# Patient Record
Sex: MALE | Race: WHITE | NOT HISPANIC OR LATINO
[De-identification: names, ages, dates, MRNs, and addresses within clinical notes are randomized per-mention and may not be internally consistent; named-entity substitution may affect disease eponyms.]

---

## 2018-05-01 ENCOUNTER — APPOINTMENT (OUTPATIENT)
Dept: ALLERGY | Facility: CLINIC | Age: 58
End: 2018-05-01
Payer: COMMERCIAL

## 2018-05-01 VITALS
OXYGEN SATURATION: 96 % | TEMPERATURE: 98.9 F | HEIGHT: 63 IN | HEART RATE: 71 BPM | WEIGHT: 240 LBS | DIASTOLIC BLOOD PRESSURE: 75 MMHG | SYSTOLIC BLOOD PRESSURE: 125 MMHG | BODY MASS INDEX: 42.52 KG/M2 | RESPIRATION RATE: 14 BRPM

## 2018-05-01 DIAGNOSIS — Z87.891 PERSONAL HISTORY OF NICOTINE DEPENDENCE: ICD-10-CM

## 2018-05-01 PROCEDURE — 95004 PERQ TESTS W/ALRGNC XTRCS: CPT

## 2018-05-01 PROCEDURE — 99204 OFFICE O/P NEW MOD 45 MIN: CPT | Mod: 25

## 2018-05-01 PROCEDURE — 95018 ALL TSTG PERQ&IQ DRUGS/BIOL: CPT

## 2018-06-06 ENCOUNTER — APPOINTMENT (OUTPATIENT)
Dept: ALLERGY | Facility: CLINIC | Age: 58
End: 2018-06-06
Payer: COMMERCIAL

## 2018-06-06 VITALS
SYSTOLIC BLOOD PRESSURE: 122 MMHG | HEIGHT: 63 IN | BODY MASS INDEX: 42.52 KG/M2 | DIASTOLIC BLOOD PRESSURE: 80 MMHG | HEART RATE: 72 BPM | WEIGHT: 240 LBS

## 2018-06-06 PROCEDURE — 99214 OFFICE O/P EST MOD 30 MIN: CPT

## 2018-06-06 RX ORDER — MULTIVIT-MIN/FOLIC/VIT K/LYCOP 400-300MCG
50 MCG TABLET ORAL
Refills: 0 | Status: ACTIVE | COMMUNITY

## 2018-06-27 ENCOUNTER — APPOINTMENT (OUTPATIENT)
Dept: ALLERGY | Facility: CLINIC | Age: 58
End: 2018-06-27
Payer: COMMERCIAL

## 2018-06-27 VITALS
RESPIRATION RATE: 14 BRPM | SYSTOLIC BLOOD PRESSURE: 113 MMHG | WEIGHT: 230 LBS | HEART RATE: 70 BPM | DIASTOLIC BLOOD PRESSURE: 67 MMHG | OXYGEN SATURATION: 97 % | BODY MASS INDEX: 40.75 KG/M2 | HEIGHT: 63 IN

## 2018-06-27 PROCEDURE — 99214 OFFICE O/P EST MOD 30 MIN: CPT | Mod: 25

## 2018-06-27 RX ORDER — PREDNISONE 10 MG/1
10 TABLET ORAL
Qty: 30 | Refills: 0 | Status: DISCONTINUED | COMMUNITY
Start: 2018-06-06 | End: 2018-06-27

## 2018-07-13 ENCOUNTER — APPOINTMENT (OUTPATIENT)
Dept: RADIOLOGY | Facility: CLINIC | Age: 58
End: 2018-07-13

## 2018-07-25 ENCOUNTER — APPOINTMENT (OUTPATIENT)
Dept: ALLERGY | Facility: CLINIC | Age: 58
End: 2018-07-25
Payer: COMMERCIAL

## 2018-07-25 VITALS
HEIGHT: 63 IN | OXYGEN SATURATION: 96 % | SYSTOLIC BLOOD PRESSURE: 109 MMHG | BODY MASS INDEX: 40.75 KG/M2 | RESPIRATION RATE: 14 BRPM | DIASTOLIC BLOOD PRESSURE: 66 MMHG | HEART RATE: 59 BPM | WEIGHT: 230 LBS

## 2018-07-25 PROCEDURE — 99214 OFFICE O/P EST MOD 30 MIN: CPT | Mod: 25

## 2018-09-05 ENCOUNTER — APPOINTMENT (OUTPATIENT)
Dept: ALLERGY | Facility: CLINIC | Age: 58
End: 2018-09-05
Payer: COMMERCIAL

## 2018-09-05 VITALS
BODY MASS INDEX: 40.75 KG/M2 | OXYGEN SATURATION: 96 % | WEIGHT: 230 LBS | SYSTOLIC BLOOD PRESSURE: 123 MMHG | RESPIRATION RATE: 14 BRPM | HEIGHT: 63 IN | DIASTOLIC BLOOD PRESSURE: 75 MMHG | HEART RATE: 63 BPM

## 2018-09-05 PROCEDURE — 99214 OFFICE O/P EST MOD 30 MIN: CPT | Mod: 25

## 2018-11-07 ENCOUNTER — RECORD ABSTRACTING (OUTPATIENT)
Age: 58
End: 2018-11-07

## 2018-11-07 DIAGNOSIS — L30.4 ERYTHEMA INTERTRIGO: ICD-10-CM

## 2018-11-07 DIAGNOSIS — Z87.19 PERSONAL HISTORY OF OTHER DISEASES OF THE DIGESTIVE SYSTEM: ICD-10-CM

## 2018-11-07 DIAGNOSIS — Z83.3 FAMILY HISTORY OF DIABETES MELLITUS: ICD-10-CM

## 2018-11-30 ENCOUNTER — APPOINTMENT (OUTPATIENT)
Dept: INTERNAL MEDICINE | Facility: CLINIC | Age: 58
End: 2018-11-30
Payer: MEDICAID

## 2018-11-30 ENCOUNTER — NON-APPOINTMENT (OUTPATIENT)
Age: 58
End: 2018-11-30

## 2018-11-30 VITALS
WEIGHT: 245 LBS | DIASTOLIC BLOOD PRESSURE: 66 MMHG | HEART RATE: 64 BPM | SYSTOLIC BLOOD PRESSURE: 104 MMHG | BODY MASS INDEX: 43.4 KG/M2

## 2018-11-30 PROCEDURE — 99214 OFFICE O/P EST MOD 30 MIN: CPT | Mod: 25

## 2018-11-30 PROCEDURE — 93000 ELECTROCARDIOGRAM COMPLETE: CPT

## 2018-11-30 PROCEDURE — 36415 COLL VENOUS BLD VENIPUNCTURE: CPT

## 2018-11-30 RX ORDER — LEVOTHYROXINE SODIUM 0.17 MG/1
TABLET ORAL
Refills: 0 | Status: DISCONTINUED | COMMUNITY
End: 2018-11-30

## 2018-11-30 RX ORDER — PAROXETINE HYDROCHLORIDE 40 MG/1
TABLET, FILM COATED ORAL
Refills: 0 | Status: DISCONTINUED | COMMUNITY
End: 2018-11-30

## 2018-11-30 RX ORDER — ALPRAZOLAM 2 MG/1
TABLET ORAL
Refills: 0 | Status: DISCONTINUED | COMMUNITY
End: 2018-11-30

## 2018-11-30 RX ORDER — HYDROXYZINE HYDROCHLORIDE 10 MG/1
10 TABLET ORAL
Qty: 120 | Refills: 3 | Status: DISCONTINUED | COMMUNITY
Start: 2018-06-27 | End: 2018-11-30

## 2018-11-30 RX ORDER — ROSUVASTATIN CALCIUM 10 MG/1
10 TABLET, FILM COATED ORAL
Refills: 0 | Status: DISCONTINUED | COMMUNITY
End: 2018-11-30

## 2018-11-30 NOTE — ASSESSMENT
[FreeTextEntry1] : Despite his only having partial relief of the itching he should continue taking the hydroxyzine at night.\par \par He is going to call me in a week for the results of today's blood tests which are focusing on his response to statin therapy and thyroid replacement hormone therapy.\par \par His anxiety will remain under treatment with episodic use of benzodiazepine.\par \par He is aware that his weight is an issue and that he would be important for him to continue with efforts to lose weight.\par \par RV 3 m

## 2018-11-30 NOTE — RESULTS/DATA
[ECG Reviewed] : reviewed [Normal] : The 12 - lead ECG is normal [P Waves Normal] : the P wave is normal [Normal QRS] : the QRS is normal [Normal ST Segments] : the ST segments are normal [No Interval Change] : no interval change

## 2018-11-30 NOTE — HISTORY OF PRESENT ILLNESS
[Patient was last seen on ___] : Patient was last seen on [unfilled] [Stable] : Patient is stable [Moderate Intensity Therapy] : Patient is currently on moderate intensity statin  therapy [FreeTextEntry6] : Existing patient\par Scheduled appointment\par Chronic problem\par Management of issues\par Coordination of therapies\par \par \par \par \par \par \par \par

## 2018-12-01 LAB
ALBUMIN SERPL ELPH-MCNC: 4.3 G/DL
ALP BLD-CCNC: 70 U/L
ALT SERPL-CCNC: 37 U/L
ANION GAP SERPL CALC-SCNC: 12 MMOL/L
AST SERPL-CCNC: 27 U/L
BASOPHILS # BLD AUTO: 0.02 K/UL
BASOPHILS NFR BLD AUTO: 0.3 %
BILIRUB SERPL-MCNC: 0.4 MG/DL
BUN SERPL-MCNC: 13 MG/DL
CALCIUM SERPL-MCNC: 9.3 MG/DL
CHLORIDE SERPL-SCNC: 101 MMOL/L
CHOLEST SERPL-MCNC: 134 MG/DL
CHOLEST/HDLC SERPL: 2.9 RATIO
CK SERPL-CCNC: 220 U/L
CO2 SERPL-SCNC: 28 MMOL/L
CREAT SERPL-MCNC: 0.97 MG/DL
EOSINOPHIL # BLD AUTO: 0.23 K/UL
EOSINOPHIL NFR BLD AUTO: 3.5 %
GLUCOSE SERPL-MCNC: 78 MG/DL
HCT VFR BLD CALC: 44.5 %
HDLC SERPL-MCNC: 47 MG/DL
HGB BLD-MCNC: 14.5 G/DL
IMM GRANULOCYTES NFR BLD AUTO: 0.2 %
LDLC SERPL CALC-MCNC: 58 MG/DL
LYMPHOCYTES # BLD AUTO: 2.05 K/UL
LYMPHOCYTES NFR BLD AUTO: 30.8 %
MAN DIFF?: NORMAL
MCHC RBC-ENTMCNC: 29.8 PG
MCHC RBC-ENTMCNC: 32.6 GM/DL
MCV RBC AUTO: 91.6 FL
MONOCYTES # BLD AUTO: 0.9 K/UL
MONOCYTES NFR BLD AUTO: 13.5 %
NEUTROPHILS # BLD AUTO: 3.45 K/UL
NEUTROPHILS NFR BLD AUTO: 51.7 %
PLATELET # BLD AUTO: 283 K/UL
POTASSIUM SERPL-SCNC: 4.2 MMOL/L
PROT SERPL-MCNC: 7 G/DL
RBC # BLD: 4.86 M/UL
RBC # FLD: 14 %
SODIUM SERPL-SCNC: 141 MMOL/L
T4 SERPL-MCNC: 6.7 UG/DL
TRIGL SERPL-MCNC: 143 MG/DL
TSH SERPL-ACNC: 1.69 UIU/ML
WBC # FLD AUTO: 6.66 K/UL

## 2019-02-28 ENCOUNTER — APPOINTMENT (OUTPATIENT)
Dept: INTERNAL MEDICINE | Facility: CLINIC | Age: 59
End: 2019-02-28
Payer: MEDICAID

## 2019-02-28 VITALS — SYSTOLIC BLOOD PRESSURE: 130 MMHG | HEART RATE: 64 BPM | DIASTOLIC BLOOD PRESSURE: 79 MMHG

## 2019-02-28 DIAGNOSIS — L29.9 PRURITUS, UNSPECIFIED: ICD-10-CM

## 2019-02-28 PROCEDURE — 99213 OFFICE O/P EST LOW 20 MIN: CPT

## 2019-02-28 NOTE — HISTORY OF PRESENT ILLNESS
[Depression] : Depression [Hyperlipidemia] : Hyperlipidemia [Obesity] : Obesity [Stable] : Patient is stable [Moderate Intensity Therapy] : Patient is currently on moderate intensity statin  therapy [No Weight Changes] : No weight changes [Sedentary] : Patient is sedentary [Good understanding] : Patient has a good understanding of disease, goals and obesity follow-up plan [Improving] : Overall status is improving [FreeTextEntry6] : Last seen three months ago\par  [de-identified] : Not current ly depressed\par Anxiety still there   chronic   takes benzo hs and hydrox   for the itch and to help get restful sleep

## 2019-02-28 NOTE — ASSESSMENT
[FreeTextEntry1] : Doing well overall\par   Chronic issues of anxiety and itch have been refractory to dx and Rx\par

## 2019-03-26 ENCOUNTER — MEDICATION RENEWAL (OUTPATIENT)
Age: 59
End: 2019-03-26

## 2019-04-11 ENCOUNTER — MEDICATION RENEWAL (OUTPATIENT)
Age: 59
End: 2019-04-11

## 2019-04-26 ENCOUNTER — CHART COPY (OUTPATIENT)
Age: 59
End: 2019-04-26

## 2019-04-29 ENCOUNTER — MEDICATION RENEWAL (OUTPATIENT)
Age: 59
End: 2019-04-29

## 2019-05-09 ENCOUNTER — APPOINTMENT (OUTPATIENT)
Dept: INTERNAL MEDICINE | Facility: CLINIC | Age: 59
End: 2019-05-09
Payer: MEDICAID

## 2019-05-09 PROCEDURE — 36415 COLL VENOUS BLD VENIPUNCTURE: CPT

## 2019-05-11 LAB
MEV IGG FLD QL IA: 71.5 AU/ML
MEV IGG+IGM SER-IMP: POSITIVE
MUV AB SER-ACNC: POSITIVE
MUV IGG SER QL IA: >300 AU/ML
RUBV IGG FLD-ACNC: 8.4 INDEX
RUBV IGG SER-IMP: POSITIVE

## 2019-06-17 ENCOUNTER — RX RENEWAL (OUTPATIENT)
Age: 59
End: 2019-06-17

## 2019-06-17 ENCOUNTER — APPOINTMENT (OUTPATIENT)
Dept: INTERNAL MEDICINE | Facility: CLINIC | Age: 59
End: 2019-06-17
Payer: MEDICAID

## 2019-06-17 VITALS — DIASTOLIC BLOOD PRESSURE: 70 MMHG | HEART RATE: 64 BPM | SYSTOLIC BLOOD PRESSURE: 124 MMHG

## 2019-06-17 PROCEDURE — 99213 OFFICE O/P EST LOW 20 MIN: CPT | Mod: 25

## 2019-06-17 PROCEDURE — 36415 COLL VENOUS BLD VENIPUNCTURE: CPT

## 2019-06-17 NOTE — HISTORY OF PRESENT ILLNESS
[Depression] : Depression [Hyperlipidemia] : Hyperlipidemia [Obesity] : Obesity [FreeTextEntry6] : Last seen three months ago\par  [Stable] : Patient is stable [Moderate Intensity Therapy] : Patient is currently on moderate intensity statin  therapy [No Weight Changes] : No weight changes [Sedentary] : Patient is sedentary [Good understanding] : Patient has a good understanding of disease, goals and obesity follow-up plan [Improving] : Overall status is improving [de-identified] : Not current ly depressed\par Anxiety still there   chronic   takes benzo hs and hydrox   for the itch and to help get restful sleep

## 2019-06-18 LAB
ALBUMIN SERPL ELPH-MCNC: 4.1 G/DL
ALP BLD-CCNC: 64 U/L
ALT SERPL-CCNC: 27 U/L
ANION GAP SERPL CALC-SCNC: 14 MMOL/L
AST SERPL-CCNC: 20 U/L
BASOPHILS # BLD AUTO: 0.04 K/UL
BASOPHILS NFR BLD AUTO: 0.6 %
BILIRUB SERPL-MCNC: 0.4 MG/DL
BUN SERPL-MCNC: 13 MG/DL
CALCIUM SERPL-MCNC: 9.5 MG/DL
CHLORIDE SERPL-SCNC: 99 MMOL/L
CHOLEST SERPL-MCNC: 138 MG/DL
CHOLEST/HDLC SERPL: 2.8 RATIO
CK SERPL-CCNC: 148 U/L
CO2 SERPL-SCNC: 27 MMOL/L
CREAT SERPL-MCNC: 1.01 MG/DL
EOSINOPHIL # BLD AUTO: 0.2 K/UL
EOSINOPHIL NFR BLD AUTO: 3.2 %
ESTIMATED AVERAGE GLUCOSE: 114 MG/DL
GGT SERPL-CCNC: 18 U/L
GLUCOSE SERPL-MCNC: 74 MG/DL
HBA1C MFR BLD HPLC: 5.6 %
HCT VFR BLD CALC: 47 %
HDLC SERPL-MCNC: 50 MG/DL
HGB BLD-MCNC: 14.8 G/DL
IMM GRANULOCYTES NFR BLD AUTO: 0.3 %
LDLC SERPL CALC-MCNC: 70 MG/DL
LYMPHOCYTES # BLD AUTO: 1.56 K/UL
LYMPHOCYTES NFR BLD AUTO: 25.1 %
MAN DIFF?: NORMAL
MCHC RBC-ENTMCNC: 29.8 PG
MCHC RBC-ENTMCNC: 31.5 GM/DL
MCV RBC AUTO: 94.6 FL
MONOCYTES # BLD AUTO: 0.66 K/UL
MONOCYTES NFR BLD AUTO: 10.6 %
NEUTROPHILS # BLD AUTO: 3.73 K/UL
NEUTROPHILS NFR BLD AUTO: 60.2 %
PLATELET # BLD AUTO: 315 K/UL
POTASSIUM SERPL-SCNC: 4.1 MMOL/L
PROT SERPL-MCNC: 7 G/DL
RBC # BLD: 4.97 M/UL
RBC # FLD: 14 %
SODIUM SERPL-SCNC: 140 MMOL/L
T4 FREE SERPL-MCNC: 1.2 NG/DL
TRIGL SERPL-MCNC: 92 MG/DL
TSH SERPL-ACNC: 1.33 UIU/ML
WBC # FLD AUTO: 6.21 K/UL

## 2019-10-13 ENCOUNTER — RX RENEWAL (OUTPATIENT)
Age: 59
End: 2019-10-13

## 2019-10-15 ENCOUNTER — APPOINTMENT (OUTPATIENT)
Dept: INTERNAL MEDICINE | Facility: CLINIC | Age: 59
End: 2019-10-15
Payer: MEDICAID

## 2019-10-15 VITALS
SYSTOLIC BLOOD PRESSURE: 125 MMHG | DIASTOLIC BLOOD PRESSURE: 79 MMHG | WEIGHT: 241 LBS | HEART RATE: 67 BPM | HEIGHT: 63 IN | BODY MASS INDEX: 42.7 KG/M2

## 2019-10-15 LAB
ALBUMIN SERPL ELPH-MCNC: 4.4 G/DL
ALP BLD-CCNC: 70 U/L
ALT SERPL-CCNC: 22 U/L
ANION GAP SERPL CALC-SCNC: 14 MMOL/L
AST SERPL-CCNC: 22 U/L
BASOPHILS # BLD AUTO: 0.04 K/UL
BASOPHILS NFR BLD AUTO: 0.6 %
BILIRUB SERPL-MCNC: 0.7 MG/DL
BUN SERPL-MCNC: 13 MG/DL
CALCIUM SERPL-MCNC: 9.5 MG/DL
CHLORIDE SERPL-SCNC: 101 MMOL/L
CHOLEST SERPL-MCNC: 129 MG/DL
CHOLEST/HDLC SERPL: 2.5 RATIO
CK SERPL-CCNC: 282 U/L
CO2 SERPL-SCNC: 25 MMOL/L
CREAT SERPL-MCNC: 0.97 MG/DL
EOSINOPHIL # BLD AUTO: 0.13 K/UL
EOSINOPHIL NFR BLD AUTO: 1.9 %
GGT SERPL-CCNC: 17 U/L
GLUCOSE SERPL-MCNC: 107 MG/DL
HCT VFR BLD CALC: 44.9 %
HDLC SERPL-MCNC: 51 MG/DL
HGB BLD-MCNC: 14.7 G/DL
IMM GRANULOCYTES NFR BLD AUTO: 0.3 %
LDLC SERPL CALC-MCNC: 62 MG/DL
LYMPHOCYTES # BLD AUTO: 1.75 K/UL
LYMPHOCYTES NFR BLD AUTO: 26 %
MAN DIFF?: NORMAL
MCHC RBC-ENTMCNC: 29.9 PG
MCHC RBC-ENTMCNC: 32.7 GM/DL
MCV RBC AUTO: 91.3 FL
MONOCYTES # BLD AUTO: 0.78 K/UL
MONOCYTES NFR BLD AUTO: 11.6 %
NEUTROPHILS # BLD AUTO: 4 K/UL
NEUTROPHILS NFR BLD AUTO: 59.6 %
PLATELET # BLD AUTO: 339 K/UL
POTASSIUM SERPL-SCNC: 4.3 MMOL/L
PROT SERPL-MCNC: 6.9 G/DL
RBC # BLD: 4.92 M/UL
RBC # FLD: 13.1 %
SODIUM SERPL-SCNC: 140 MMOL/L
T4 FREE SERPL-MCNC: 1.5 NG/DL
TRIGL SERPL-MCNC: 79 MG/DL
TSH SERPL-ACNC: 1.84 UIU/ML
WBC # FLD AUTO: 6.72 K/UL

## 2019-10-15 PROCEDURE — 90686 IIV4 VACC NO PRSV 0.5 ML IM: CPT

## 2019-10-15 PROCEDURE — 36415 COLL VENOUS BLD VENIPUNCTURE: CPT

## 2019-10-15 PROCEDURE — G0008: CPT

## 2019-10-15 PROCEDURE — 99214 OFFICE O/P EST MOD 30 MIN: CPT | Mod: 25

## 2019-10-15 NOTE — PHYSICAL EXAM
[No Acute Distress] : no acute distress [Well Nourished] : well nourished [Well Developed] : well developed [Well-Appearing] : well-appearing [PERRL] : pupils equal round and reactive to light [Normal Sclera/Conjunctiva] : normal sclera/conjunctiva [EOMI] : extraocular movements intact [Normal Outer Ear/Nose] : the outer ears and nose were normal in appearance [Normal Oropharynx] : the oropharynx was normal [No JVD] : no jugular venous distention [No Lymphadenopathy] : no lymphadenopathy [Supple] : supple [Thyroid Normal, No Nodules] : the thyroid was normal and there were no nodules present [Clear to Auscultation] : lungs were clear to auscultation bilaterally [No Respiratory Distress] : no respiratory distress  [Normal Rate] : normal rate  [No Accessory Muscle Use] : no accessory muscle use [Regular Rhythm] : with a regular rhythm [No Murmur] : no murmur heard [Normal S1, S2] : normal S1 and S2 [No Carotid Bruits] : no carotid bruits [No Abdominal Bruit] : a ~M bruit was not heard ~T in the abdomen [No Varicosities] : no varicosities [Pedal Pulses Present] : the pedal pulses are present [No Edema] : there was no peripheral edema [No Extremity Clubbing/Cyanosis] : no extremity clubbing/cyanosis [Soft] : abdomen soft [No Palpable Aorta] : no palpable aorta [Non Tender] : non-tender [Non-distended] : non-distended [No Masses] : no abdominal mass palpated [No HSM] : no HSM [Normal Bowel Sounds] : normal bowel sounds [Normal Posterior Cervical Nodes] : no posterior cervical lymphadenopathy [Normal Anterior Cervical Nodes] : no anterior cervical lymphadenopathy [No CVA Tenderness] : no CVA  tenderness [No Spinal Tenderness] : no spinal tenderness [No Joint Swelling] : no joint swelling [Grossly Normal Strength/Tone] : grossly normal strength/tone [No Rash] : no rash [Coordination Grossly Intact] : coordination grossly intact [Normal Gait] : normal gait [No Focal Deficits] : no focal deficits [Deep Tendon Reflexes (DTR)] : deep tendon reflexes were 2+ and symmetric [Normal Affect] : the affect was normal [Normal Insight/Judgement] : insight and judgment were intact

## 2019-10-15 NOTE — HISTORY OF PRESENT ILLNESS
[Depression] : Depression [Hyperlipidemia] : Hyperlipidemia [Obesity] : Obesity [Stable] : Patient is stable [Moderate Intensity Therapy] : Patient is currently on moderate intensity statin  therapy [No Weight Changes] : No weight changes [Sedentary] : Patient is sedentary [Improving] : Overall status is improving [FreeTextEntry6] : Last seen three months ago\par  [de-identified] : Not current ly depressed\par Anxiety still there   chronic   takes benzo hs and hydrox   for the itch and to help get restful sleep

## 2019-12-27 ENCOUNTER — APPOINTMENT (OUTPATIENT)
Dept: INTERNAL MEDICINE | Facility: AMBULATORY MEDICAL SERVICES | Age: 59
End: 2019-12-27
Payer: MEDICAID

## 2019-12-27 PROCEDURE — G0105: CPT | Mod: PT

## 2019-12-27 PROCEDURE — 45378 DIAGNOSTIC COLONOSCOPY: CPT | Mod: PT

## 2020-02-10 ENCOUNTER — APPOINTMENT (OUTPATIENT)
Dept: INTERNAL MEDICINE | Facility: CLINIC | Age: 60
End: 2020-02-10
Payer: COMMERCIAL

## 2020-02-10 VITALS
DIASTOLIC BLOOD PRESSURE: 76 MMHG | BODY MASS INDEX: 43.77 KG/M2 | HEART RATE: 68 BPM | OXYGEN SATURATION: 97 % | HEIGHT: 63 IN | RESPIRATION RATE: 16 BRPM | SYSTOLIC BLOOD PRESSURE: 119 MMHG | WEIGHT: 247 LBS | TEMPERATURE: 98.4 F

## 2020-02-10 DIAGNOSIS — Z12.12 ENCOUNTER FOR SCREENING FOR MALIGNANT NEOPLASM OF COLON: ICD-10-CM

## 2020-02-10 DIAGNOSIS — Z12.11 ENCOUNTER FOR SCREENING FOR MALIGNANT NEOPLASM OF COLON: ICD-10-CM

## 2020-02-10 LAB
ALBUMIN SERPL ELPH-MCNC: 4.3 G/DL
ALP BLD-CCNC: 77 U/L
ALT SERPL-CCNC: 25 U/L
ANION GAP SERPL CALC-SCNC: 10 MMOL/L
AST SERPL-CCNC: 23 U/L
BASOPHILS # BLD AUTO: 0.05 K/UL
BASOPHILS NFR BLD AUTO: 0.8 %
BILIRUB SERPL-MCNC: 0.4 MG/DL
BUN SERPL-MCNC: 11 MG/DL
CALCIUM SERPL-MCNC: 9.6 MG/DL
CHLORIDE SERPL-SCNC: 100 MMOL/L
CHOLEST SERPL-MCNC: 134 MG/DL
CHOLEST/HDLC SERPL: 2.7 RATIO
CK SERPL-CCNC: 160 U/L
CO2 SERPL-SCNC: 29 MMOL/L
CREAT SERPL-MCNC: 1.12 MG/DL
EOSINOPHIL # BLD AUTO: 0.14 K/UL
EOSINOPHIL NFR BLD AUTO: 2.2 %
GGT SERPL-CCNC: 20 U/L
GLUCOSE SERPL-MCNC: 109 MG/DL
HCT VFR BLD CALC: 44.7 %
HDLC SERPL-MCNC: 49 MG/DL
HGB BLD-MCNC: 14.8 G/DL
IMM GRANULOCYTES NFR BLD AUTO: 0.6 %
LDLC SERPL CALC-MCNC: 60 MG/DL
LYMPHOCYTES # BLD AUTO: 1.92 K/UL
LYMPHOCYTES NFR BLD AUTO: 30.7 %
MAN DIFF?: NORMAL
MCHC RBC-ENTMCNC: 29.1 PG
MCHC RBC-ENTMCNC: 33.1 GM/DL
MCV RBC AUTO: 88 FL
MONOCYTES # BLD AUTO: 0.66 K/UL
MONOCYTES NFR BLD AUTO: 10.5 %
NEUTROPHILS # BLD AUTO: 3.45 K/UL
NEUTROPHILS NFR BLD AUTO: 55.2 %
PLATELET # BLD AUTO: 329 K/UL
POTASSIUM SERPL-SCNC: 4.6 MMOL/L
PROT SERPL-MCNC: 6.9 G/DL
RBC # BLD: 5.08 M/UL
RBC # FLD: 13.4 %
SODIUM SERPL-SCNC: 140 MMOL/L
T4 FREE SERPL-MCNC: 1.2 NG/DL
TRIGL SERPL-MCNC: 123 MG/DL
TSH SERPL-ACNC: 2.04 UIU/ML
WBC # FLD AUTO: 6.26 K/UL

## 2020-02-10 PROCEDURE — 99214 OFFICE O/P EST MOD 30 MIN: CPT

## 2020-02-10 NOTE — PHYSICAL EXAM
[No Acute Distress] : no acute distress [Well Nourished] : well nourished [Well Developed] : well developed [Well-Appearing] : well-appearing [Normal Sclera/Conjunctiva] : normal sclera/conjunctiva [PERRL] : pupils equal round and reactive to light [EOMI] : extraocular movements intact [Normal Outer Ear/Nose] : the outer ears and nose were normal in appearance [Normal Oropharynx] : the oropharynx was normal [No JVD] : no jugular venous distention [No Lymphadenopathy] : no lymphadenopathy [Supple] : supple [Thyroid Normal, No Nodules] : the thyroid was normal and there were no nodules present [No Respiratory Distress] : no respiratory distress  [No Accessory Muscle Use] : no accessory muscle use [Clear to Auscultation] : lungs were clear to auscultation bilaterally [Normal Rate] : normal rate  [Regular Rhythm] : with a regular rhythm [Normal S1, S2] : normal S1 and S2 [No Murmur] : no murmur heard [No Carotid Bruits] : no carotid bruits [No Abdominal Bruit] : a ~M bruit was not heard ~T in the abdomen [No Varicosities] : no varicosities [Pedal Pulses Present] : the pedal pulses are present [No Palpable Aorta] : no palpable aorta [No Edema] : there was no peripheral edema [No Extremity Clubbing/Cyanosis] : no extremity clubbing/cyanosis [Soft] : abdomen soft [Non Tender] : non-tender [Non-distended] : non-distended [No Masses] : no abdominal mass palpated [No HSM] : no HSM [Normal Bowel Sounds] : normal bowel sounds [Normal Posterior Cervical Nodes] : no posterior cervical lymphadenopathy [Normal Anterior Cervical Nodes] : no anterior cervical lymphadenopathy [No CVA Tenderness] : no CVA  tenderness [No Spinal Tenderness] : no spinal tenderness [No Joint Swelling] : no joint swelling [Grossly Normal Strength/Tone] : grossly normal strength/tone [No Rash] : no rash [Coordination Grossly Intact] : coordination grossly intact [No Focal Deficits] : no focal deficits [Normal Gait] : normal gait [Deep Tendon Reflexes (DTR)] : deep tendon reflexes were 2+ and symmetric [Normal Affect] : the affect was normal [Normal Insight/Judgement] : insight and judgment were intact

## 2020-02-10 NOTE — HISTORY OF PRESENT ILLNESS
[FreeTextEntry1] : F/U\par \par Continuation of care\par \par Adherent to meds\par No apparent interval changes

## 2020-02-11 ENCOUNTER — RX RENEWAL (OUTPATIENT)
Age: 60
End: 2020-02-11

## 2020-02-12 LAB
THYROGLOB AB SERPL-ACNC: <20 IU/ML
THYROPEROXIDASE AB SERPL IA-ACNC: <10 IU/ML

## 2020-02-14 LAB — TOTAL T3-ESOTERIX: 130 NG/DL

## 2020-04-09 PROBLEM — Z12.11 ENCOUNTER FOR COLORECTAL CANCER SCREENING: Status: RESOLVED | Noted: 2019-12-18 | Resolved: 2020-04-09

## 2020-04-19 ENCOUNTER — RX RENEWAL (OUTPATIENT)
Age: 60
End: 2020-04-19

## 2020-06-08 ENCOUNTER — APPOINTMENT (OUTPATIENT)
Dept: INTERNAL MEDICINE | Facility: CLINIC | Age: 60
End: 2020-06-08

## 2020-06-09 ENCOUNTER — RESULT CHARGE (OUTPATIENT)
Age: 60
End: 2020-06-09

## 2020-06-09 ENCOUNTER — RX RENEWAL (OUTPATIENT)
Age: 60
End: 2020-06-09

## 2020-06-10 ENCOUNTER — LABORATORY RESULT (OUTPATIENT)
Age: 60
End: 2020-06-10

## 2020-06-10 ENCOUNTER — APPOINTMENT (OUTPATIENT)
Dept: INTERNAL MEDICINE | Facility: CLINIC | Age: 60
End: 2020-06-10
Payer: COMMERCIAL

## 2020-06-10 ENCOUNTER — NON-APPOINTMENT (OUTPATIENT)
Age: 60
End: 2020-06-10

## 2020-06-10 ENCOUNTER — MED ADMIN CHARGE (OUTPATIENT)
Age: 60
End: 2020-06-10

## 2020-06-10 VITALS
BODY MASS INDEX: 39.69 KG/M2 | OXYGEN SATURATION: 98 % | HEIGHT: 63 IN | SYSTOLIC BLOOD PRESSURE: 98 MMHG | TEMPERATURE: 98.4 F | DIASTOLIC BLOOD PRESSURE: 60 MMHG | RESPIRATION RATE: 14 BRPM | HEART RATE: 65 BPM | WEIGHT: 224 LBS

## 2020-06-10 DIAGNOSIS — Z63.5 DISRUPTION OF FAMILY BY SEPARATION AND DIVORCE: ICD-10-CM

## 2020-06-10 DIAGNOSIS — Z13.6 ENCOUNTER FOR SCREENING FOR CARDIOVASCULAR DISORDERS: ICD-10-CM

## 2020-06-10 DIAGNOSIS — Z83.3 FAMILY HISTORY OF DIABETES MELLITUS: ICD-10-CM

## 2020-06-10 DIAGNOSIS — Z12.5 ENCOUNTER FOR SCREENING FOR MALIGNANT NEOPLASM OF PROSTATE: ICD-10-CM

## 2020-06-10 LAB
BILIRUB UR QL STRIP: NEGATIVE
CLARITY UR: CLEAR
COLLECTION METHOD: NORMAL
GLUCOSE UR-MCNC: NEGATIVE
HCG UR QL: 0.2 EU/DL
HGB UR QL STRIP.AUTO: NEGATIVE
KETONES UR-MCNC: NEGATIVE
LEUKOCYTE ESTERASE UR QL STRIP: NEGATIVE
NITRITE UR QL STRIP: NEGATIVE
PH UR STRIP: 5.5
PROT UR STRIP-MCNC: NEGATIVE
SP GR UR STRIP: 1.02

## 2020-06-10 PROCEDURE — 90715 TDAP VACCINE 7 YRS/> IM: CPT

## 2020-06-10 PROCEDURE — 90471 IMMUNIZATION ADMIN: CPT

## 2020-06-10 PROCEDURE — G0442 ANNUAL ALCOHOL SCREEN 15 MIN: CPT

## 2020-06-10 PROCEDURE — 99386 PREV VISIT NEW AGE 40-64: CPT | Mod: 25

## 2020-06-10 PROCEDURE — 93000 ELECTROCARDIOGRAM COMPLETE: CPT

## 2020-06-10 PROCEDURE — 82270 OCCULT BLOOD FECES: CPT

## 2020-06-10 PROCEDURE — 36415 COLL VENOUS BLD VENIPUNCTURE: CPT

## 2020-06-10 PROCEDURE — 81003 URINALYSIS AUTO W/O SCOPE: CPT | Mod: QW

## 2020-06-10 SDOH — SOCIAL STABILITY - SOCIAL INSECURITY: DISRUPTION OF FAMILY BY SEPARATION AND DIVORCE: Z63.5

## 2020-06-10 NOTE — PHYSICAL EXAM
[No Acute Distress] : no acute distress [Well Nourished] : well nourished [Well Developed] : well developed [Normal Sclera/Conjunctiva] : normal sclera/conjunctiva [Well-Appearing] : well-appearing [PERRL] : pupils equal round and reactive to light [EOMI] : extraocular movements intact [Normal Outer Ear/Nose] : the outer ears and nose were normal in appearance [Normal Oropharynx] : the oropharynx was normal [No JVD] : no jugular venous distention [No Lymphadenopathy] : no lymphadenopathy [Supple] : supple [Thyroid Normal, No Nodules] : the thyroid was normal and there were no nodules present [No Respiratory Distress] : no respiratory distress  [No Accessory Muscle Use] : no accessory muscle use [Clear to Auscultation] : lungs were clear to auscultation bilaterally [Normal Rate] : normal rate  [Regular Rhythm] : with a regular rhythm [Normal S1, S2] : normal S1 and S2 [No Murmur] : no murmur heard [No Carotid Bruits] : no carotid bruits [No Abdominal Bruit] : a ~M bruit was not heard ~T in the abdomen [No Varicosities] : no varicosities [Pedal Pulses Present] : the pedal pulses are present [No Edema] : there was no peripheral edema [No Palpable Aorta] : no palpable aorta [No Extremity Clubbing/Cyanosis] : no extremity clubbing/cyanosis [Soft] : abdomen soft [Non Tender] : non-tender [No Masses] : no abdominal mass palpated [Non-distended] : non-distended [No HSM] : no HSM [Normal Bowel Sounds] : normal bowel sounds [Normal Posterior Cervical Nodes] : no posterior cervical lymphadenopathy [Normal Anterior Cervical Nodes] : no anterior cervical lymphadenopathy [No Spinal Tenderness] : no spinal tenderness [No CVA Tenderness] : no CVA  tenderness [No Joint Swelling] : no joint swelling [Grossly Normal Strength/Tone] : grossly normal strength/tone [No Rash] : no rash [Coordination Grossly Intact] : coordination grossly intact [No Focal Deficits] : no focal deficits [Deep Tendon Reflexes (DTR)] : deep tendon reflexes were 2+ and symmetric [Normal Gait] : normal gait [Normal Affect] : the affect was normal [Normal Insight/Judgement] : insight and judgment were intact [Obese] : obese [Normal Sphincter Tone] : normal sphincter tone [Urethral Meatus] : meatus normal [Penis Abnormality] : normal circumcised penis [Urinary Bladder Findings] : the bladder was normal on palpation [Scrotum] : the scrotum was normal [Rectal Exam - Seminal Vesicles] : the seminal vesicles were normal [Epididymis] : the epididymides were normal [Speech Grossly Normal] : speech grossly normal [Testes Tenderness] : no tenderness of the testes [Alert and Oriented x3] : oriented to person, place, and time [Normal Mood] : the mood was normal [Stool Occult Blood] : stool negative for occult blood [FreeTextEntry1] : small hemorrhoid   [de-identified] : small abrasion left scalp area

## 2020-06-10 NOTE — PLAN
[FreeTextEntry1] : cardio   -hyperlipidemia, obesity  - screening for CAD -  We reviewed and discussed the EKG .  Patient was advised the importance of participating in aerobic exercise programs improve their stamina.  GILBERT was encourage to start an exercise program.\par \par Hyperlipidemia- Advised diet and exercise.  Will considerer lowering rosuvastatin pending labs\par \par Endo - Hypothyroidism - Check TSH/thyroid panel .  Counseling given to the pt.  Continue with current medication \par hyperglycemia - recent weight loss - Advised to continue with better eating habits.  check a1c.\par \par  - Check PSA levels-  Low fat diet \par \par Gi - hx colon polyp- small hemorrhoid  advised increase fiber in the diet \par Dermatology GILBERT was encouraged to wear sun protective clothing, sun block, and proper use of SPF board brim hats, to seek shade and to avoid the sun in peak hours.  ABCD of skin moles was advised. \par small abraion left scalp - Tdap given last vax over 10 years ago.\par \par Patient  education  - COVID-19   Counseling and education provided to the patient.  Advised sign and symptoms of the virus.  Advised contact precautions.  Educated patient on proper hand washing and to participate in social distancing. . \par \par Patient is in full awareness of the plan and agrees to it.  All pt question was answered.  \par \par  check covid AB \par \par Immunization Tdap given today.\par Advised to consider shingles vax

## 2020-06-10 NOTE — HISTORY OF PRESENT ILLNESS
[FreeTextEntry1] : New patient physical  [de-identified] : Mr. LARSEN is a 59 year  male, who present to the office for for a cpx

## 2020-06-10 NOTE — COUNSELING
[Benefits of weight loss discussed] : Benefits of weight loss discussed [Encouraged to maintain food diary] : Encouraged to maintain food diary [Encouraged to increase physical activity] : Encouraged to increase physical activity [Decrease Portions] : decrease portions [Good understanding] : Patient has a good understanding of disease, goals and obesity follow-up plan [Behavioral health counseling provided] : Behavioral health counseling provided

## 2020-06-10 NOTE — REVIEW OF SYSTEMS
[Recent Change In Weight] : ~T recent weight change [Negative] : Heme/Lymph [FreeTextEntry2] : weight  loss

## 2020-06-10 NOTE — ASSESSMENT
[FreeTextEntry1] : Mr. LARSEN is a 59 year  male, who present to the office as a new patient for a CPX

## 2020-06-10 NOTE — HEALTH RISK ASSESSMENT
[Good] : ~his/her~  mood as  good [0-5] : 0-5 [Yes] : Yes [2 - 4 times a month (2 pts)] : 2-4 times a month (2 points) [1 or 2 (0 pts)] : 1 or 2 (0 points) [Never (0 pts)] : Never (0 points) [No] : In the past 12 months have you used drugs other than those required for medical reasons? No [No falls in past year] : Patient reported no falls in the past year [0] : 2) Feeling down, depressed, or hopeless: Not at all (0) [No Retinopathy] : No retinopathy [Patient reported colonoscopy was abnormal] : Patient reported colonoscopy was abnormal [HIV test declined] : HIV test declined [Hepatitis C test declined] : Hepatitis C test declined [With Significant Other] : lives with significant other [# of Members in Household ___] :  household currently consist of [unfilled] member(s) [Employed] : employed [] :  [# Of Children ___] : has [unfilled] children [Significant Other] : lives with significant other [Sexually Active] : sexually active [Feels Safe at Home] : Feels safe at home [Fully functional (bathing, dressing, toileting, transferring, walking, feeding)] : Fully functional (bathing, dressing, toileting, transferring, walking, feeding) [Fully functional (using the telephone, shopping, preparing meals, housekeeping, doing laundry, using] : Fully functional and needs no help or supervision to perform IADLs (using the telephone, shopping, preparing meals, housekeeping, doing laundry, using transportation, managing medications and managing finances) [Reports normal functional visual acuity (ie: able to read med bottle)] : Reports normal functional visual acuity [Smoke Detector] : smoke detector [Carbon Monoxide Detector] : carbon monoxide detector [Seat Belt] :  uses seat belt [Sunscreen] : uses sunscreen [Patient/Caregiver unclear of wishes] : Patient/Caregiver unclear of wishes [FreeTextEntry1] : weight  [] : No [YearQuit] : 1985 [Audit-CScore] : 2 [de-identified] : Denies  [de-identified] : Weigh watches  [[id number]] : 0 [EyeExamDate] : 06/19 [Change in mental status noted] : No change in mental status noted [High Risk Behavior] : no high risk behavior [Reports changes in hearing] : Reports no changes in hearing [Reports changes in vision] : Reports no changes in vision [Reports changes in dental health] : Reports no changes in dental health [TB Exposure] : is not being exposed to tuberculosis [ColonoscopyDate] : 12/19 [ColonoscopyComments] : colon polyp [de-identified] : Home improvements  [AdvancecareDate] : 06/20

## 2020-06-18 LAB
25(OH)D3 SERPL-MCNC: 36.1 NG/ML
ALBUMIN SERPL ELPH-MCNC: 4.3 G/DL
ALP BLD-CCNC: 67 U/L
ALT SERPL-CCNC: 17 U/L
ANION GAP SERPL CALC-SCNC: 12 MMOL/L
AST SERPL-CCNC: 22 U/L
BASOPHILS # BLD AUTO: 0.04 K/UL
BASOPHILS NFR BLD AUTO: 0.7 %
BILIRUB SERPL-MCNC: 0.4 MG/DL
BUN SERPL-MCNC: 17 MG/DL
CALCIUM SERPL-MCNC: 9.3 MG/DL
CHLORIDE SERPL-SCNC: 101 MMOL/L
CHOLEST SERPL-MCNC: 137 MG/DL
CHOLEST/HDLC SERPL: 2.7 RATIO
CO2 SERPL-SCNC: 27 MMOL/L
CREAT SERPL-MCNC: 0.93 MG/DL
EOSINOPHIL # BLD AUTO: 0.14 K/UL
EOSINOPHIL NFR BLD AUTO: 2.3 %
ESTIMATED AVERAGE GLUCOSE: 111 MG/DL
GLUCOSE SERPL-MCNC: 110 MG/DL
HBA1C MFR BLD HPLC: 5.5 %
HCT VFR BLD CALC: 46.3 %
HDLC SERPL-MCNC: 51 MG/DL
HGB BLD-MCNC: 14.3 G/DL
IMM GRANULOCYTES NFR BLD AUTO: 0.2 %
LDLC SERPL CALC-MCNC: 73 MG/DL
LYMPHOCYTES # BLD AUTO: 1.76 K/UL
LYMPHOCYTES NFR BLD AUTO: 29.1 %
MAN DIFF?: NORMAL
MCHC RBC-ENTMCNC: 29.4 PG
MCHC RBC-ENTMCNC: 30.9 GM/DL
MCV RBC AUTO: 95.1 FL
MONOCYTES # BLD AUTO: 0.68 K/UL
MONOCYTES NFR BLD AUTO: 11.3 %
NEUTROPHILS # BLD AUTO: 3.41 K/UL
NEUTROPHILS NFR BLD AUTO: 56.4 %
PLATELET # BLD AUTO: 270 K/UL
POTASSIUM SERPL-SCNC: 4.3 MMOL/L
PROT SERPL-MCNC: 6.8 G/DL
PSA SERPL-MCNC: 0.66 NG/ML
RBC # BLD: 4.87 M/UL
RBC # FLD: 14.2 %
SARS-COV-2 IGG SERPL IA-ACNC: 0.1 INDEX
SARS-COV-2 IGG SERPL QL IA: NEGATIVE
SODIUM SERPL-SCNC: 139 MMOL/L
T3 SERPL-MCNC: 113 NG/DL
T3FREE SERPL-MCNC: 2.88 PG/ML
T3RU NFR SERPL: 1 TBI
T4 FREE SERPL-MCNC: 1.1 NG/DL
T4 SERPL-MCNC: 7 UG/DL
THYROGLOB AB SERPL-ACNC: <20 IU/ML
THYROPEROXIDASE AB SERPL IA-ACNC: 20.5 IU/ML
TRIGL SERPL-MCNC: 64 MG/DL
TSH SERPL-ACNC: 1.06 UIU/ML
WBC # FLD AUTO: 6.04 K/UL

## 2020-09-09 ENCOUNTER — APPOINTMENT (OUTPATIENT)
Dept: INTERNAL MEDICINE | Facility: CLINIC | Age: 60
End: 2020-09-09
Payer: COMMERCIAL

## 2020-09-09 VITALS
BODY MASS INDEX: 38.98 KG/M2 | SYSTOLIC BLOOD PRESSURE: 114 MMHG | OXYGEN SATURATION: 96 % | HEIGHT: 63 IN | WEIGHT: 220 LBS | HEART RATE: 72 BPM | DIASTOLIC BLOOD PRESSURE: 68 MMHG | TEMPERATURE: 98.5 F | RESPIRATION RATE: 15 BRPM

## 2020-09-09 LAB
BILIRUB UR QL STRIP: NORMAL
CLARITY UR: CLEAR
COLLECTION METHOD: NORMAL
GLUCOSE UR-MCNC: NORMAL
HCG UR QL: 0.2 EU/DL
HGB UR QL STRIP.AUTO: NORMAL
KETONES UR-MCNC: NORMAL
LEUKOCYTE ESTERASE UR QL STRIP: NORMAL
NITRITE UR QL STRIP: NORMAL
PH UR STRIP: 5.5
PROT UR STRIP-MCNC: NORMAL
SP GR UR STRIP: 1.03

## 2020-09-09 PROCEDURE — 81003 URINALYSIS AUTO W/O SCOPE: CPT | Mod: QW

## 2020-09-09 PROCEDURE — 99213 OFFICE O/P EST LOW 20 MIN: CPT | Mod: 25

## 2020-09-09 PROCEDURE — 36415 COLL VENOUS BLD VENIPUNCTURE: CPT

## 2020-09-09 NOTE — HISTORY OF PRESENT ILLNESS
[de-identified] : Mr. LARSEN is a 59 year  male, who present to the office for renewal of medications.\par States he is doing well on current medications.  Denies any side effects to them.  \par Still continue to lose weight. \par on occasion notice BRBPR.  Had a colonoscopy 2019-  was dx with hemorrhoids  [FreeTextEntry1] : Fasting labs and renewal of medications

## 2020-09-09 NOTE — HEALTH RISK ASSESSMENT
[Yes] : Yes [0-5] : 0-5 [2 - 4 times a month (2 pts)] : 2-4 times a month (2 points) [Never (0 pts)] : Never (0 points) [1 or 2 (0 pts)] : 1 or 2 (0 points) [No falls in past year] : Patient reported no falls in the past year [No] : In the past 12 months have you used drugs other than those required for medical reasons? No [0] : 2) Feeling down, depressed, or hopeless: Not at all (0) [] : No [de-identified] : Denies  [YearQuit] : 1985 [Audit-CScore] : 2 [[id number]] : 0 [de-identified] : Weigh watches

## 2020-09-09 NOTE — REVIEW OF SYSTEMS
[Negative] : Heme/Lymph [Recent Change In Weight] : ~T recent weight change [Melena] : meljaimie [Fever] : no fever [Vision Problems] : no vision problems [Earache] : no earache [Chest Pain] : no chest pain [Palpitations] : no palpitations [Lower Ext Edema] : no lower extremity edema [Shortness Of Breath] : no shortness of breath [Cough] : no cough [Abdominal Pain] : no abdominal pain [Dysuria] : no dysuria [Joint Pain] : no joint pain [Back Pain] : no back pain [Itching] : no itching [Skin Rash] : no skin rash

## 2020-09-09 NOTE — PHYSICAL EXAM
[Well Nourished] : well nourished [No Acute Distress] : no acute distress [Well Developed] : well developed [Normal Sclera/Conjunctiva] : normal sclera/conjunctiva [PERRL] : pupils equal round and reactive to light [Well-Appearing] : well-appearing [EOMI] : extraocular movements intact [Normal Outer Ear/Nose] : the outer ears and nose were normal in appearance [No JVD] : no jugular venous distention [Normal Oropharynx] : the oropharynx was normal [Supple] : supple [No Lymphadenopathy] : no lymphadenopathy [Thyroid Normal, No Nodules] : the thyroid was normal and there were no nodules present [No Respiratory Distress] : no respiratory distress  [No Accessory Muscle Use] : no accessory muscle use [Clear to Auscultation] : lungs were clear to auscultation bilaterally [Normal Rate] : normal rate  [Normal S1, S2] : normal S1 and S2 [Regular Rhythm] : with a regular rhythm [No Abdominal Bruit] : a ~M bruit was not heard ~T in the abdomen [No Murmur] : no murmur heard [No Carotid Bruits] : no carotid bruits [No Varicosities] : no varicosities [Pedal Pulses Present] : the pedal pulses are present [No Edema] : there was no peripheral edema [No Palpable Aorta] : no palpable aorta [No Extremity Clubbing/Cyanosis] : no extremity clubbing/cyanosis [Soft] : abdomen soft [Non Tender] : non-tender [No Masses] : no abdominal mass palpated [Non-distended] : non-distended [No HSM] : no HSM [Normal Bowel Sounds] : normal bowel sounds [Obese] : obese [Normal Sphincter Tone] : normal sphincter tone [Urethral Meatus] : meatus normal [Penis Abnormality] : normal circumcised penis [Scrotum] : the scrotum was normal [Urinary Bladder Findings] : the bladder was normal on palpation [Rectal Exam - Seminal Vesicles] : the seminal vesicles were normal [Testes Tenderness] : no tenderness of the testes [Epididymis] : the epididymides were normal [Normal Posterior Cervical Nodes] : no posterior cervical lymphadenopathy [Normal Anterior Cervical Nodes] : no anterior cervical lymphadenopathy [No Joint Swelling] : no joint swelling [No CVA Tenderness] : no CVA  tenderness [No Spinal Tenderness] : no spinal tenderness [Grossly Normal Strength/Tone] : grossly normal strength/tone [Coordination Grossly Intact] : coordination grossly intact [No Rash] : no rash [Normal Gait] : normal gait [No Focal Deficits] : no focal deficits [Deep Tendon Reflexes (DTR)] : deep tendon reflexes were 2+ and symmetric [Speech Grossly Normal] : speech grossly normal [Normal Affect] : the affect was normal [Alert and Oriented x3] : oriented to person, place, and time [Normal Mood] : the mood was normal [Normal Insight/Judgement] : insight and judgment were intact [Stool Occult Blood] : stool negative for occult blood [de-identified] : small abrasion left scalp area  [FreeTextEntry1] : small hemorrhoid

## 2020-09-09 NOTE — COUNSELING
[Behavioral health counseling provided] : Behavioral health counseling provided [Benefits of weight loss discussed] : Benefits of weight loss discussed [Encouraged to maintain food diary] : Encouraged to maintain food diary [Encouraged to increase physical activity] : Encouraged to increase physical activity [Decrease Portions] : decrease portions [Good understanding] : Patient has a good understanding of disease, goals and obesity follow-up plan

## 2020-09-09 NOTE — PLAN
[FreeTextEntry1] : cardio   -hyperlipidemia, obesity   Patient was advised the importance of participating in aerobic exercise programs improve their stamina.  GILBERT was encourage to start an exercise program.  Check lipids if remains the same decrease Crestor to 5 mg.  Patient agrees with the plan\par \par Endo - Hypothyroidism - Check TSH/thyroid panel .  Counseling given to the pt.  Continue with current medication \par hyperglycemia - recent weight loss - Advised to continue with better eating habits.  check a1c.\par \par GI - hx colon polyp- small hemorrhoid  advised increase fiber in the diet  - Follow up with GI\par \par Advised to consider shingles vax \par Flu shot advised in 10/20\par \par Patient  education  - COVID-19   Counseling and education provided to the patient.  Advised sign and symptoms of the virus.  Advised contact precautions.  Educated patient on proper hand washing and to participate in social distancing. . \par \par Patient is in full awareness of the plan and agrees to it.  All pt question was answered.  \par \par  I spent 15 Minutes with the patient, half of which we discussed finding on physical exam  and coordinated care.  As well as reviewed my plans and follow ups.

## 2020-09-09 NOTE — ASSESSMENT
[FreeTextEntry1] : Mr. LARSEN is a 59 year  male, who present to the office for a renewal of medication and fasting labs

## 2020-09-14 LAB
25(OH)D3 SERPL-MCNC: 33.4 NG/ML
ALBUMIN SERPL ELPH-MCNC: 4.5 G/DL
ALP BLD-CCNC: 65 U/L
ALT SERPL-CCNC: 20 U/L
ANION GAP SERPL CALC-SCNC: 14 MMOL/L
AST SERPL-CCNC: 22 U/L
BASOPHILS # BLD AUTO: 0.03 K/UL
BASOPHILS NFR BLD AUTO: 0.5 %
BILIRUB SERPL-MCNC: 0.5 MG/DL
BUN SERPL-MCNC: 15 MG/DL
CALCIUM SERPL-MCNC: 9.3 MG/DL
CHLORIDE SERPL-SCNC: 101 MMOL/L
CHOLEST SERPL-MCNC: 154 MG/DL
CHOLEST/HDLC SERPL: 2.6 RATIO
CO2 SERPL-SCNC: 25 MMOL/L
CREAT SERPL-MCNC: 0.98 MG/DL
EOSINOPHIL # BLD AUTO: 0.11 K/UL
EOSINOPHIL NFR BLD AUTO: 1.9 %
ESTIMATED AVERAGE GLUCOSE: 108 MG/DL
GLUCOSE SERPL-MCNC: 83 MG/DL
HBA1C MFR BLD HPLC: 5.4 %
HCT VFR BLD CALC: 45.7 %
HDLC SERPL-MCNC: 59 MG/DL
HGB BLD-MCNC: 14 G/DL
IMM GRANULOCYTES NFR BLD AUTO: 0.2 %
LDLC SERPL CALC-MCNC: 81 MG/DL
LYMPHOCYTES # BLD AUTO: 1.82 K/UL
LYMPHOCYTES NFR BLD AUTO: 31.7 %
MAN DIFF?: NORMAL
MCHC RBC-ENTMCNC: 29 PG
MCHC RBC-ENTMCNC: 30.6 GM/DL
MCV RBC AUTO: 94.8 FL
MONOCYTES # BLD AUTO: 0.58 K/UL
MONOCYTES NFR BLD AUTO: 10.1 %
NEUTROPHILS # BLD AUTO: 3.2 K/UL
NEUTROPHILS NFR BLD AUTO: 55.6 %
PLATELET # BLD AUTO: 306 K/UL
POTASSIUM SERPL-SCNC: 4.7 MMOL/L
PROT SERPL-MCNC: 6.7 G/DL
RBC # BLD: 4.82 M/UL
RBC # FLD: 14 %
SODIUM SERPL-SCNC: 140 MMOL/L
T4 FREE SERPL-MCNC: 1.2 NG/DL
TRIGL SERPL-MCNC: 69 MG/DL
TSH SERPL-ACNC: 1.53 UIU/ML
WBC # FLD AUTO: 5.75 K/UL

## 2020-10-05 ENCOUNTER — APPOINTMENT (OUTPATIENT)
Dept: INTERNAL MEDICINE | Facility: CLINIC | Age: 60
End: 2020-10-05

## 2020-12-21 ENCOUNTER — LABORATORY RESULT (OUTPATIENT)
Age: 60
End: 2020-12-21

## 2020-12-21 ENCOUNTER — APPOINTMENT (OUTPATIENT)
Dept: INTERNAL MEDICINE | Facility: CLINIC | Age: 60
End: 2020-12-21
Payer: COMMERCIAL

## 2020-12-21 VITALS
WEIGHT: 242 LBS | DIASTOLIC BLOOD PRESSURE: 72 MMHG | BODY MASS INDEX: 42.88 KG/M2 | TEMPERATURE: 98 F | HEART RATE: 74 BPM | OXYGEN SATURATION: 98 % | HEIGHT: 63 IN | RESPIRATION RATE: 16 BRPM | SYSTOLIC BLOOD PRESSURE: 128 MMHG

## 2020-12-21 PROCEDURE — 99072 ADDL SUPL MATRL&STAF TM PHE: CPT

## 2020-12-21 PROCEDURE — 99213 OFFICE O/P EST LOW 20 MIN: CPT | Mod: 25

## 2020-12-21 PROCEDURE — 36415 COLL VENOUS BLD VENIPUNCTURE: CPT

## 2020-12-21 NOTE — HEALTH RISK ASSESSMENT
[] : No [0-5] : 0-5 [Yes] : Yes [2 - 4 times a month (2 pts)] : 2-4 times a month (2 points) [1 or 2 (0 pts)] : 1 or 2 (0 points) [Never (0 pts)] : Never (0 points) [No] : In the past 12 months have you used drugs other than those required for medical reasons? No [No falls in past year] : Patient reported no falls in the past year [0] : 2) Feeling down, depressed, or hopeless: Not at all (0) [YearQuit] : 1985 [Audit-CScore] : 2 [de-identified] : Denies  [de-identified] : Weigh watches  [[id number]] : 0

## 2020-12-21 NOTE — COUNSELING
[Behavioral health counseling provided] : Behavioral health counseling provided [Benefits of weight loss discussed] : Benefits of weight loss discussed [Encouraged to maintain food diary] : Encouraged to maintain food diary [Encouraged to increase physical activity] : Encouraged to increase physical activity [Decrease Portions] : decrease portions [Good understanding] : Patient has a good understanding of disease, goals and obesity follow-up plan [de-identified] : STI counseling

## 2020-12-21 NOTE — REVIEW OF SYSTEMS
[Fever] : no fever [Recent Change In Weight] : ~T no recent weight change [Vision Problems] : no vision problems [Earache] : no earache [Hearing Loss] : no hearing loss [Nasal Discharge] : no nasal discharge [Sore Throat] : no sore throat [Chest Pain] : no chest pain [Palpitations] : no palpitations [Lower Ext Edema] : no lower extremity edema [Paroxysmal Nocturnal Dyspnea] : no paroxysmal nocturnal dyspnea [Shortness Of Breath] : no shortness of breath [Wheezing] : no wheezing [Cough] : no cough [Dyspnea on Exertion] : not dyspnea on exertion [Abdominal Pain] : no abdominal pain [Vomiting] : no vomiting [Melena] : no melena [Dysuria] : no dysuria [Joint Pain] : no joint pain [Muscle Weakness] : no muscle weakness [Back Pain] : no back pain [Itching] : no itching [Hair Changes] : no hair changes [Skin Rash] : no skin rash [Headache] : no headache [Fainting] : no fainting [Memory Loss] : no memory loss [Swollen Glands] : no swollen glands [Negative] : Heme/Lymph

## 2020-12-21 NOTE — PLAN
[FreeTextEntry1] : cardio   -hyperlipidemia, obesity -  Patient was advised the importance of participating in aerobic exercise programs improve their stamina.  GILBERT was encourage to start an exercise program.  Check lipids.  renewed medications. \par \par Endo - Hypothyroidism - Check TSH.  Counseling given to the pt.  Continue with current medication \par hyperglycemia- Advised to continue with better eating habits.  Monitor a1c \par \par Immunization Flu shot given at local pharmacy will call and get the date \par \par Shingrix - discussed, patient to determine if vaccine is covered under medical benefits of current insurance plan and follow up for 1st dose if interested; otherwise, instructed to follow up at the pharmacy for vaccine \par \par Patient  education  - COVID-19   Counseling and education provided to the patient.  Advised sign and symptoms of the virus.  Advised contact precautions.  Educated patient on proper hand washing and to participate in social distancing.\par Patient is in full awareness of the plan and agrees to it.  All pt question was answered.  \par \par  I spent 18  Minutes with the patient, half of which we discussed finding on physical exam  and coordinated care.  As well as reviewed my plans and follow ups.

## 2020-12-21 NOTE — HISTORY OF PRESENT ILLNESS
[FreeTextEntry1] : Fasting labs and renewal of medications  [de-identified] : Mr. LARSEN is a 60 year  male, who present to the office for fasting labs and renewal of medications.  states he is doing well.  States he started a new relationship and wants to be screened for STI.  \chintan Denies chest pain, SOB, VAZQUEZ.\chintan Takes medications as directed without side effects.\chintan Had flu shot done at his local pharmacy

## 2020-12-21 NOTE — ASSESSMENT
[FreeTextEntry1] : Mr. LARSEN is a 89 year  male, who present to the office for a renewal of medication and fasting labs

## 2020-12-21 NOTE — PHYSICAL EXAM
[No Acute Distress] : no acute distress [Well Nourished] : well nourished [Well Developed] : well developed [Well-Appearing] : well-appearing [Normal Sclera/Conjunctiva] : normal sclera/conjunctiva [PERRL] : pupils equal round and reactive to light [EOMI] : extraocular movements intact [Normal Outer Ear/Nose] : the outer ears and nose were normal in appearance [Normal Oropharynx] : the oropharynx was normal [Normal Nasal Mucosa] : the nasal mucosa was normal [No JVD] : no jugular venous distention [No Lymphadenopathy] : no lymphadenopathy [Supple] : supple [Thyroid Normal, No Nodules] : the thyroid was normal and there were no nodules present [No Respiratory Distress] : no respiratory distress  [No Accessory Muscle Use] : no accessory muscle use [Clear to Auscultation] : lungs were clear to auscultation bilaterally [Normal Rate] : normal rate  [Regular Rhythm] : with a regular rhythm [Normal S1, S2] : normal S1 and S2 [No Murmur] : no murmur heard [No Carotid Bruits] : no carotid bruits [No Abdominal Bruit] : a ~M bruit was not heard ~T in the abdomen [No Varicosities] : no varicosities [Pedal Pulses Present] : the pedal pulses are present [No Edema] : there was no peripheral edema [No Palpable Aorta] : no palpable aorta [No Extremity Clubbing/Cyanosis] : no extremity clubbing/cyanosis [Soft] : abdomen soft [Non Tender] : non-tender [Non-distended] : non-distended [No Masses] : no abdominal mass palpated [No HSM] : no HSM [Normal Bowel Sounds] : normal bowel sounds [Obese] : obese [Normal Sphincter Tone] : normal sphincter tone [Stool Occult Blood] : stool negative for occult blood [Urethral Meatus] : meatus normal [Penis Abnormality] : normal circumcised penis [Urinary Bladder Findings] : the bladder was normal on palpation [Scrotum] : the scrotum was normal [Rectal Exam - Seminal Vesicles] : the seminal vesicles were normal [Epididymis] : the epididymides were normal [Testes Tenderness] : no tenderness of the testes [Normal Posterior Cervical Nodes] : no posterior cervical lymphadenopathy [Normal Anterior Cervical Nodes] : no anterior cervical lymphadenopathy [No CVA Tenderness] : no CVA  tenderness [No Spinal Tenderness] : no spinal tenderness [No Joint Swelling] : no joint swelling [Grossly Normal Strength/Tone] : grossly normal strength/tone [No Rash] : no rash [Coordination Grossly Intact] : coordination grossly intact [No Focal Deficits] : no focal deficits [Normal Gait] : normal gait [Deep Tendon Reflexes (DTR)] : deep tendon reflexes were 2+ and symmetric [Speech Grossly Normal] : speech grossly normal [Normal Affect] : the affect was normal [Alert and Oriented x3] : oriented to person, place, and time [Normal Mood] : the mood was normal [Normal Insight/Judgement] : insight and judgment were intact [FreeTextEntry1] : small hemorrhoid   [de-identified] : small abrasion left scalp area

## 2020-12-23 LAB
25(OH)D3 SERPL-MCNC: 33.5 NG/ML
ALBUMIN SERPL ELPH-MCNC: 4.4 G/DL
ALP BLD-CCNC: 67 U/L
ALT SERPL-CCNC: 13 U/L
ANION GAP SERPL CALC-SCNC: 8 MMOL/L
AST SERPL-CCNC: 15 U/L
BASOPHILS # BLD AUTO: 0.03 K/UL
BASOPHILS NFR BLD AUTO: 0.6 %
BILIRUB SERPL-MCNC: 0.4 MG/DL
BUN SERPL-MCNC: 14 MG/DL
CALCIUM SERPL-MCNC: 9.6 MG/DL
CHLORIDE SERPL-SCNC: 98 MMOL/L
CHOLEST SERPL-MCNC: 145 MG/DL
CO2 SERPL-SCNC: 28 MMOL/L
CREAT SERPL-MCNC: 1.08 MG/DL
EOSINOPHIL # BLD AUTO: 0.12 K/UL
EOSINOPHIL NFR BLD AUTO: 2.3 %
ESTIMATED AVERAGE GLUCOSE: 111 MG/DL
GLUCOSE SERPL-MCNC: 95 MG/DL
HAV IGM SER QL: NONREACTIVE
HBA1C MFR BLD HPLC: 5.5 %
HBV CORE IGG+IGM SER QL: REACTIVE
HBV SURFACE AB SER QL: NONREACTIVE
HBV SURFACE AG SER QL: NONREACTIVE
HCT VFR BLD CALC: 45.8 %
HCV AB SER QL: NONREACTIVE
HCV S/CO RATIO: 0.07 S/CO
HDLC SERPL-MCNC: 55 MG/DL
HEPATITIS A IGG ANTIBODY: NONREACTIVE
HGB BLD-MCNC: 14.9 G/DL
HSV 1+2 IGG SER IA-IMP: POSITIVE
HSV1 IGG SER QL: 50.5 INDEX
IMM GRANULOCYTES NFR BLD AUTO: 0.4 %
LDLC SERPL CALC-MCNC: 75 MG/DL
LYMPHOCYTES # BLD AUTO: 1.54 K/UL
LYMPHOCYTES NFR BLD AUTO: 29.3 %
MAN DIFF?: NORMAL
MCHC RBC-ENTMCNC: 29.6 PG
MCHC RBC-ENTMCNC: 32.5 GM/DL
MCV RBC AUTO: 90.9 FL
MONOCYTES # BLD AUTO: 0.6 K/UL
MONOCYTES NFR BLD AUTO: 11.4 %
NEUTROPHILS # BLD AUTO: 2.94 K/UL
NEUTROPHILS NFR BLD AUTO: 56 %
NONHDLC SERPL-MCNC: 90 MG/DL
PLATELET # BLD AUTO: 322 K/UL
POTASSIUM SERPL-SCNC: 4.1 MMOL/L
PROT SERPL-MCNC: 6.9 G/DL
RBC # BLD: 5.04 M/UL
RBC # FLD: 13.5 %
SODIUM SERPL-SCNC: 135 MMOL/L
TRIGL SERPL-MCNC: 77 MG/DL
TSH SERPL-ACNC: 0.83 UIU/ML
WBC # FLD AUTO: 5.25 K/UL

## 2020-12-30 LAB
HBV E AB SER QL: NEGATIVE
HBV E AG SER QL: NEGATIVE
RPR SER-TITR: NORMAL

## 2021-01-05 ENCOUNTER — APPOINTMENT (OUTPATIENT)
Dept: INTERNAL MEDICINE | Facility: CLINIC | Age: 61
End: 2021-01-05
Payer: COMMERCIAL

## 2021-01-05 VITALS
SYSTOLIC BLOOD PRESSURE: 112 MMHG | HEIGHT: 63 IN | TEMPERATURE: 97.4 F | BODY MASS INDEX: 40.75 KG/M2 | WEIGHT: 230 LBS | DIASTOLIC BLOOD PRESSURE: 68 MMHG | HEART RATE: 68 BPM | RESPIRATION RATE: 16 BRPM | OXYGEN SATURATION: 98 %

## 2021-01-05 DIAGNOSIS — B00.9 HERPESVIRAL INFECTION, UNSPECIFIED: ICD-10-CM

## 2021-01-05 LAB
BILIRUB UR QL STRIP: NORMAL
CLARITY UR: CLEAR
COLLECTION METHOD: NORMAL
GLUCOSE UR-MCNC: NORMAL
HCG UR QL: 0.2 EU/DL
HGB UR QL STRIP.AUTO: NORMAL
KETONES UR-MCNC: NORMAL
LEUKOCYTE ESTERASE UR QL STRIP: NORMAL
NITRITE UR QL STRIP: NORMAL
PH UR STRIP: 7
PROT UR STRIP-MCNC: NORMAL
SP GR UR STRIP: 1.01

## 2021-01-05 PROCEDURE — 81003 URINALYSIS AUTO W/O SCOPE: CPT | Mod: QW

## 2021-01-05 PROCEDURE — 36415 COLL VENOUS BLD VENIPUNCTURE: CPT

## 2021-01-05 PROCEDURE — 99072 ADDL SUPL MATRL&STAF TM PHE: CPT

## 2021-01-05 PROCEDURE — 99213 OFFICE O/P EST LOW 20 MIN: CPT | Mod: 25

## 2021-01-05 NOTE — PHYSICAL EXAM
[No Acute Distress] : no acute distress [Well Nourished] : well nourished [Well Developed] : well developed [Well-Appearing] : well-appearing [Normal Sclera/Conjunctiva] : normal sclera/conjunctiva [PERRL] : pupils equal round and reactive to light [EOMI] : extraocular movements intact [Normal Outer Ear/Nose] : the outer ears and nose were normal in appearance [Normal Oropharynx] : the oropharynx was normal [Normal Nasal Mucosa] : the nasal mucosa was normal [No JVD] : no jugular venous distention [No Lymphadenopathy] : no lymphadenopathy [Supple] : supple [Thyroid Normal, No Nodules] : the thyroid was normal and there were no nodules present [No Respiratory Distress] : no respiratory distress  [No Accessory Muscle Use] : no accessory muscle use [Clear to Auscultation] : lungs were clear to auscultation bilaterally [Normal Rate] : normal rate  [Regular Rhythm] : with a regular rhythm [Normal S1, S2] : normal S1 and S2 [No Murmur] : no murmur heard [No Carotid Bruits] : no carotid bruits [No Abdominal Bruit] : a ~M bruit was not heard ~T in the abdomen [No Varicosities] : no varicosities [Pedal Pulses Present] : the pedal pulses are present [No Edema] : there was no peripheral edema [No Palpable Aorta] : no palpable aorta [No Extremity Clubbing/Cyanosis] : no extremity clubbing/cyanosis [Soft] : abdomen soft [Non Tender] : non-tender [Non-distended] : non-distended [No Masses] : no abdominal mass palpated [No HSM] : no HSM [Normal Bowel Sounds] : normal bowel sounds [Obese] : obese [Normal Sphincter Tone] : normal sphincter tone [Urethral Meatus] : meatus normal [Penis Abnormality] : normal circumcised penis [Urinary Bladder Findings] : the bladder was normal on palpation [Scrotum] : the scrotum was normal [Rectal Exam - Seminal Vesicles] : the seminal vesicles were normal [Epididymis] : the epididymides were normal [Testes Tenderness] : no tenderness of the testes [Normal Posterior Cervical Nodes] : no posterior cervical lymphadenopathy [Normal Anterior Cervical Nodes] : no anterior cervical lymphadenopathy [No CVA Tenderness] : no CVA  tenderness [No Spinal Tenderness] : no spinal tenderness [No Joint Swelling] : no joint swelling [Grossly Normal Strength/Tone] : grossly normal strength/tone [No Rash] : no rash [Coordination Grossly Intact] : coordination grossly intact [No Focal Deficits] : no focal deficits [Normal Gait] : normal gait [Deep Tendon Reflexes (DTR)] : deep tendon reflexes were 2+ and symmetric [Speech Grossly Normal] : speech grossly normal [Normal Affect] : the affect was normal [Alert and Oriented x3] : oriented to person, place, and time [Normal Mood] : the mood was normal [Normal Insight/Judgement] : insight and judgment were intact [Stool Occult Blood] : stool negative for occult blood [FreeTextEntry1] : small hemorrhoid   [de-identified] : small abrasion left scalp area

## 2021-01-05 NOTE — HISTORY OF PRESENT ILLNESS
[FreeTextEntry1] : Repeat labs for HSV and hepatitis B  [de-identified] : Mr. LARSEN is a 60 year  male, who present to the office for Repeat labs.  States he went to an urgent care years ago for a a shot.  States he cant recall for what it was exactly but believes it was for hepatitis B.  \par Also was advised that the full STI panel was not completed.  PT forgot to leave a urine last visit for c/g.  \par tried to call the urgent care facility but no longer at the location

## 2021-01-05 NOTE — HEALTH RISK ASSESSMENT
[0-5] : 0-5 [Yes] : Yes [2 - 4 times a month (2 pts)] : 2-4 times a month (2 points) [1 or 2 (0 pts)] : 1 or 2 (0 points) [Never (0 pts)] : Never (0 points) [No] : In the past 12 months have you used drugs other than those required for medical reasons? No [No falls in past year] : Patient reported no falls in the past year [0] : 2) Feeling down, depressed, or hopeless: Not at all (0) [] : No [YearQuit] : 1985 [Audit-CScore] : 2 [de-identified] : Denies  [de-identified] : Weigh watches  [[id number]] : 0

## 2021-01-05 NOTE — PLAN
[FreeTextEntry1] : Cardio   -hyperlipidemia, obesity -  Patient was advised the importance of participating in aerobic exercise programs improve their stamina.  GILBERT was encourage to start an exercise program.    Renewed medications. \par \par GI - viral hep B core ab positive-  will repeat hepatitis b labs and send pt for liver sonogram \par \par STI- - sexual education advised\par discussed HSV1 virus will recheck for HSV 2 since the labs never ran the test. Check urine for G/C\par \par Endo - Hypothyroidism -  Counseling given to the pt.  Continue with current medication \par hyperglycemia- Advised to continue with better eating habits.  \par \par Shingrix - discussed, patient to determine if vaccine is covered under medical benefits of current insurance plan and follow up for 1st dose if interested; otherwise, instructed to follow up at the pharmacy for vaccine \par \par Patient  education  - COVID-19   Counseling and education provided to the patient.  Advised sign and symptoms of the virus.  Advised contact precautions.  Educated patient on proper hand washing and to participate in social distancing.\par Patient is in full awareness of the plan and agrees to it.  All pt question was answered.  \par \par  I spent 18  Minutes with the patient, half of which we discussed finding on physical exam  and coordinated care.  As well as reviewed my plans and follow ups.

## 2021-01-05 NOTE — ASSESSMENT
[FreeTextEntry1] : Mr. LARSEN is a 60 year  male, who present to the office for repeat labs and discussion on hepatitis and hsv

## 2021-01-05 NOTE — COUNSELING
[Behavioral health counseling provided] : Behavioral health counseling provided [Benefits of weight loss discussed] : Benefits of weight loss discussed [Encouraged to maintain food diary] : Encouraged to maintain food diary [Encouraged to increase physical activity] : Encouraged to increase physical activity [Decrease Portions] : decrease portions [Good understanding] : Patient has a good understanding of disease, goals and obesity follow-up plan [de-identified] : STI counseling

## 2021-01-05 NOTE — REVIEW OF SYSTEMS
[Negative] : Heme/Lymph [Fever] : no fever [Recent Change In Weight] : ~T no recent weight change [Vision Problems] : no vision problems [Earache] : no earache [Hearing Loss] : no hearing loss [Nasal Discharge] : no nasal discharge [Sore Throat] : no sore throat [Chest Pain] : no chest pain [Palpitations] : no palpitations [Lower Ext Edema] : no lower extremity edema [Paroxysmal Nocturnal Dyspnea] : no paroxysmal nocturnal dyspnea [Shortness Of Breath] : no shortness of breath [Wheezing] : no wheezing [Cough] : no cough [Dyspnea on Exertion] : not dyspnea on exertion [Abdominal Pain] : no abdominal pain [Vomiting] : no vomiting [Melena] : no melena [Dysuria] : no dysuria [Joint Pain] : no joint pain [Muscle Weakness] : no muscle weakness [Back Pain] : no back pain [Itching] : no itching [Hair Changes] : no hair changes [Skin Rash] : no skin rash [Headache] : no headache [Fainting] : no fainting [Memory Loss] : no memory loss [Swollen Glands] : no swollen glands

## 2021-01-13 ENCOUNTER — NON-APPOINTMENT (OUTPATIENT)
Age: 61
End: 2021-01-13

## 2021-01-13 LAB
ANA SER IF-ACNC: NEGATIVE
AST SERPL-CCNC: 20 U/L
C TRACH RRNA SPEC QL NAA+PROBE: NOT DETECTED
HBV CORE IGG+IGM SER QL: REACTIVE
HBV CORE IGM SER QL: NONREACTIVE
HBV DNA # SERPL NAA+PROBE: NOT DETECTED IU/ML
HBV E AB SER QL: NEGATIVE
HBV E AG SER QL: NEGATIVE
HBV SURFACE AB SER QL: NONREACTIVE
HBV SURFACE AB SERPL IA-ACNC: <3 MIU/ML
HBV SURFACE AG SER QL: NONREACTIVE
HEPB DNA PCR LOG: NOT DETECTED LOG10IU/ML
HSV 1+2 IGG SER IA-IMP: NEGATIVE
HSV 1+2 IGG SER IA-IMP: POSITIVE
HSV1 IGG SER QL: 46.2 INDEX
HSV1 IGM SER QL: NORMAL TITER
HSV2 AB FLD-ACNC: NORMAL TITER
HSV2 IGG SER QL: 0.24 INDEX
N GONORRHOEA RRNA SPEC QL NAA+PROBE: NOT DETECTED
SMOOTH MUSCLE AB SER QL IF: NORMAL
SOURCE AMPLIFICATION: NORMAL

## 2021-01-28 ENCOUNTER — OUTPATIENT (OUTPATIENT)
Dept: OUTPATIENT SERVICES | Facility: HOSPITAL | Age: 61
LOS: 1 days | End: 2021-01-28
Payer: COMMERCIAL

## 2021-01-28 ENCOUNTER — APPOINTMENT (OUTPATIENT)
Dept: ULTRASOUND IMAGING | Facility: CLINIC | Age: 61
End: 2021-01-28
Payer: COMMERCIAL

## 2021-01-28 DIAGNOSIS — R94.5 ABNORMAL RESULTS OF LIVER FUNCTION STUDIES: ICD-10-CM

## 2021-01-28 PROCEDURE — 76700 US EXAM ABDOM COMPLETE: CPT

## 2021-01-28 PROCEDURE — 76700 US EXAM ABDOM COMPLETE: CPT | Mod: 26

## 2021-02-04 ENCOUNTER — NON-APPOINTMENT (OUTPATIENT)
Age: 61
End: 2021-02-04

## 2021-02-04 DIAGNOSIS — K76.89 OTHER SPECIFIED DISEASES OF LIVER: ICD-10-CM

## 2021-03-05 ENCOUNTER — RX RENEWAL (OUTPATIENT)
Age: 61
End: 2021-03-05

## 2021-03-11 DIAGNOSIS — B19.10 UNSPECIFIED VIRAL HEPATITIS B W/OUT HEPATIC COMA: ICD-10-CM

## 2021-03-11 DIAGNOSIS — R93.5 ABNORMAL FINDINGS ON DIAGNOSTIC IMAGING OF OTHER ABDOMINAL REGIONS, INCLUDING RETROPERITONEUM: ICD-10-CM

## 2021-03-18 ENCOUNTER — APPOINTMENT (OUTPATIENT)
Dept: MRI IMAGING | Facility: CLINIC | Age: 61
End: 2021-03-18
Payer: COMMERCIAL

## 2021-03-18 ENCOUNTER — OUTPATIENT (OUTPATIENT)
Dept: OUTPATIENT SERVICES | Facility: HOSPITAL | Age: 61
LOS: 1 days | End: 2021-03-18
Payer: COMMERCIAL

## 2021-03-18 DIAGNOSIS — R94.5 ABNORMAL RESULTS OF LIVER FUNCTION STUDIES: ICD-10-CM

## 2021-03-18 DIAGNOSIS — B19.10 UNSPECIFIED VIRAL HEPATITIS B WITHOUT HEPATIC COMA: ICD-10-CM

## 2021-03-18 DIAGNOSIS — K76.89 OTHER SPECIFIED DISEASES OF LIVER: ICD-10-CM

## 2021-03-18 DIAGNOSIS — R93.5 ABNORMAL FINDINGS ON DIAGNOSTIC IMAGING OF OTHER ABDOMINAL REGIONS, INCLUDING RETROPERITONEUM: ICD-10-CM

## 2021-03-18 PROCEDURE — 74183 MRI ABD W/O CNTR FLWD CNTR: CPT

## 2021-03-18 PROCEDURE — A9585: CPT

## 2021-03-18 PROCEDURE — 74183 MRI ABD W/O CNTR FLWD CNTR: CPT | Mod: 26

## 2021-03-26 ENCOUNTER — RX RENEWAL (OUTPATIENT)
Age: 61
End: 2021-03-26

## 2021-03-29 ENCOUNTER — APPOINTMENT (OUTPATIENT)
Dept: INTERNAL MEDICINE | Facility: CLINIC | Age: 61
End: 2021-03-29
Payer: COMMERCIAL

## 2021-03-29 VITALS
SYSTOLIC BLOOD PRESSURE: 130 MMHG | TEMPERATURE: 97.8 F | WEIGHT: 234 LBS | HEIGHT: 63 IN | HEART RATE: 68 BPM | RESPIRATION RATE: 16 BRPM | OXYGEN SATURATION: 98 % | BODY MASS INDEX: 41.46 KG/M2 | DIASTOLIC BLOOD PRESSURE: 70 MMHG

## 2021-03-29 DIAGNOSIS — D18.03 HEMANGIOMA OF INTRA-ABDOMINAL STRUCTURES: ICD-10-CM

## 2021-03-29 PROCEDURE — 99214 OFFICE O/P EST MOD 30 MIN: CPT | Mod: 25

## 2021-03-29 PROCEDURE — 99072 ADDL SUPL MATRL&STAF TM PHE: CPT

## 2021-03-29 PROCEDURE — 36415 COLL VENOUS BLD VENIPUNCTURE: CPT

## 2021-03-29 NOTE — COUNSELING
[Behavioral health counseling provided] : Behavioral health counseling provided [Benefits of weight loss discussed] : Benefits of weight loss discussed [Encouraged to maintain food diary] : Encouraged to maintain food diary [Encouraged to increase physical activity] : Encouraged to increase physical activity [Decrease Portions] : decrease portions [Good understanding] : Patient has a good understanding of disease, goals and obesity follow-up plan [de-identified] : STI counseling

## 2021-03-29 NOTE — ASSESSMENT
[FreeTextEntry1] : Mr. LARSEN is a 60 year  male, who present to the office for fasting labs, and renewal of medication.

## 2021-03-29 NOTE — PHYSICAL EXAM
[Well Nourished] : well nourished [Well Developed] : well developed [Well-Appearing] : well-appearing [Normal Sclera/Conjunctiva] : normal sclera/conjunctiva [PERRL] : pupils equal round and reactive to light [EOMI] : extraocular movements intact [Normal Outer Ear/Nose] : the outer ears and nose were normal in appearance [Normal Oropharynx] : the oropharynx was normal [Normal Nasal Mucosa] : the nasal mucosa was normal [No JVD] : no jugular venous distention [No Lymphadenopathy] : no lymphadenopathy [Supple] : supple [Thyroid Normal, No Nodules] : the thyroid was normal and there were no nodules present [No Respiratory Distress] : no respiratory distress  [No Accessory Muscle Use] : no accessory muscle use [Clear to Auscultation] : lungs were clear to auscultation bilaterally [Normal Rate] : normal rate  [Regular Rhythm] : with a regular rhythm [Normal S1, S2] : normal S1 and S2 [No Murmur] : no murmur heard [No Carotid Bruits] : no carotid bruits [No Abdominal Bruit] : a ~M bruit was not heard ~T in the abdomen [No Varicosities] : no varicosities [Pedal Pulses Present] : the pedal pulses are present [No Edema] : there was no peripheral edema [No Palpable Aorta] : no palpable aorta [No Extremity Clubbing/Cyanosis] : no extremity clubbing/cyanosis [Soft] : abdomen soft [Non Tender] : non-tender [Non-distended] : non-distended [No Masses] : no abdominal mass palpated [No HSM] : no HSM [Normal Bowel Sounds] : normal bowel sounds [Obese] : obese [Normal Sphincter Tone] : normal sphincter tone [Urethral Meatus] : meatus normal [Penis Abnormality] : normal circumcised penis [Urinary Bladder Findings] : the bladder was normal on palpation [Scrotum] : the scrotum was normal [Rectal Exam - Seminal Vesicles] : the seminal vesicles were normal [Epididymis] : the epididymides were normal [Testes Tenderness] : no tenderness of the testes [Normal Posterior Cervical Nodes] : no posterior cervical lymphadenopathy [Normal Anterior Cervical Nodes] : no anterior cervical lymphadenopathy [No CVA Tenderness] : no CVA  tenderness [No Spinal Tenderness] : no spinal tenderness [No Joint Swelling] : no joint swelling [Grossly Normal Strength/Tone] : grossly normal strength/tone [No Rash] : no rash [Coordination Grossly Intact] : coordination grossly intact [No Focal Deficits] : no focal deficits [Normal Gait] : normal gait [Deep Tendon Reflexes (DTR)] : deep tendon reflexes were 2+ and symmetric [Speech Grossly Normal] : speech grossly normal [Normal Affect] : the affect was normal [Alert and Oriented x3] : oriented to person, place, and time [Normal Mood] : the mood was normal [Normal Insight/Judgement] : insight and judgment were intact [Stool Occult Blood] : stool negative for occult blood [FreeTextEntry1] : small hemorrhoid   [de-identified] : small abrasion left scalp area

## 2021-03-29 NOTE — HISTORY OF PRESENT ILLNESS
[FreeTextEntry1] : Fasting labs, renewal of medication,  and review of recent MRI  [de-identified] : Mr. LARSEN is a 60 year  male, who present to the office for fasting labs.  Also would like to review recent MRI of the abdomen with contrast. \par Requesting a refill on Xanax.  States he takes it for OCTAVIO.  Doing well on current dose.  Denies any side effects to the medications \par Patient Denies chest pain, SOB, VAZQUEZ

## 2021-03-29 NOTE — HEALTH RISK ASSESSMENT
[0-5] : 0-5 [Yes] : Yes [2 - 4 times a month (2 pts)] : 2-4 times a month (2 points) [1 or 2 (0 pts)] : 1 or 2 (0 points) [Never (0 pts)] : Never (0 points) [No] : In the past 12 months have you used drugs other than those required for medical reasons? No [No falls in past year] : Patient reported no falls in the past year [0] : 2) Feeling down, depressed, or hopeless: Not at all (0) [] : No [YearQuit] : 1985 [Audit-CScore] : 2 [de-identified] : Denies  [de-identified] : Weigh watches  [[id number]] : 0

## 2021-03-29 NOTE — PLAN
[FreeTextEntry1] : Cardio   -hyperlipidemia, obesity -  Patient was advised the importance of participating in aerobic exercise programs improve their stamina.  GILBERT was encourage to start an exercise program.    Renewed medications. \par Check labs - Advised weight loss \par \par GI - viral hep B core ab positive-  MRI liver showed hemangioma and liver cyst no hepatocellular carcinoma \par \par Psych - OCTAVIO- Checked -  Refilled xanax.  Counseling given \par \par Endo - Hypothyroidism -  Counseling given to the pt.  Continue with current medication \par hyperglycemia- Advised to continue with better eating habits.  Check a1c today \par \par Shingrix - discussed, patient to determine if vaccine is covered under medical benefits of current insurance plan and follow up for 1st dose if interested; otherwise, instructed to follow up at the pharmacy for vaccine \par \par Patient  education  - COVID-19   Counseling and education provided to the patient.  Advised sign and symptoms of the virus.  Advised contact precautions.  Educated patient on proper hand washing and to participate in social distancing.\par Patient is in full awareness of the plan and agrees to it.  All pt question was answered.  \par

## 2021-03-31 ENCOUNTER — NON-APPOINTMENT (OUTPATIENT)
Age: 61
End: 2021-03-31

## 2021-04-07 LAB
25(OH)D3 SERPL-MCNC: 42.2 NG/ML
ALBUMIN SERPL ELPH-MCNC: 4.4 G/DL
ALP BLD-CCNC: 65 U/L
ALT SERPL-CCNC: 15 U/L
ANION GAP SERPL CALC-SCNC: 8 MMOL/L
AST SERPL-CCNC: 22 U/L
BASOPHILS # BLD AUTO: 0.04 K/UL
BASOPHILS NFR BLD AUTO: 0.7 %
BILIRUB SERPL-MCNC: 0.5 MG/DL
BUN SERPL-MCNC: 13 MG/DL
CALCIUM SERPL-MCNC: 9.6 MG/DL
CHLORIDE SERPL-SCNC: 101 MMOL/L
CHOLEST SERPL-MCNC: 156 MG/DL
CO2 SERPL-SCNC: 28 MMOL/L
CREAT SERPL-MCNC: 1 MG/DL
EOSINOPHIL # BLD AUTO: 0.1 K/UL
EOSINOPHIL NFR BLD AUTO: 1.8 %
ESTIMATED AVERAGE GLUCOSE: 108 MG/DL
GLUCOSE SERPL-MCNC: 114 MG/DL
HBA1C MFR BLD HPLC: 5.4 %
HCT VFR BLD CALC: 45.2 %
HDLC SERPL-MCNC: 63 MG/DL
HGB BLD-MCNC: 15 G/DL
IMM GRANULOCYTES NFR BLD AUTO: 0.4 %
LDLC SERPL CALC-MCNC: 82 MG/DL
LYMPHOCYTES # BLD AUTO: 1.49 K/UL
LYMPHOCYTES NFR BLD AUTO: 26.1 %
MAN DIFF?: NORMAL
MCHC RBC-ENTMCNC: 30.2 PG
MCHC RBC-ENTMCNC: 33.2 GM/DL
MCV RBC AUTO: 90.9 FL
MONOCYTES # BLD AUTO: 0.62 K/UL
MONOCYTES NFR BLD AUTO: 10.9 %
NEUTROPHILS # BLD AUTO: 3.44 K/UL
NEUTROPHILS NFR BLD AUTO: 60.1 %
NONHDLC SERPL-MCNC: 93 MG/DL
PLATELET # BLD AUTO: 296 K/UL
POTASSIUM SERPL-SCNC: 4.5 MMOL/L
PROT SERPL-MCNC: 6.8 G/DL
PSA SERPL-MCNC: 0.71 NG/ML
RBC # BLD: 4.97 M/UL
RBC # FLD: 13.6 %
SODIUM SERPL-SCNC: 138 MMOL/L
TRIGL SERPL-MCNC: 59 MG/DL
TSH SERPL-ACNC: 1.37 UIU/ML
WBC # FLD AUTO: 5.71 K/UL

## 2021-06-18 ENCOUNTER — RX RENEWAL (OUTPATIENT)
Age: 61
End: 2021-06-18

## 2021-06-22 ENCOUNTER — RX RENEWAL (OUTPATIENT)
Age: 61
End: 2021-06-22

## 2021-06-23 ENCOUNTER — RX RENEWAL (OUTPATIENT)
Age: 61
End: 2021-06-23

## 2021-07-01 ENCOUNTER — APPOINTMENT (OUTPATIENT)
Dept: INTERNAL MEDICINE | Facility: CLINIC | Age: 61
End: 2021-07-01
Payer: COMMERCIAL

## 2021-07-01 VITALS
HEART RATE: 68 BPM | BODY MASS INDEX: 40.75 KG/M2 | DIASTOLIC BLOOD PRESSURE: 70 MMHG | TEMPERATURE: 97.7 F | HEIGHT: 63 IN | WEIGHT: 230 LBS | SYSTOLIC BLOOD PRESSURE: 116 MMHG | RESPIRATION RATE: 16 BRPM | OXYGEN SATURATION: 98 %

## 2021-07-01 DIAGNOSIS — Z86.19 PERSONAL HISTORY OF OTHER INFECTIOUS AND PARASITIC DISEASES: ICD-10-CM

## 2021-07-01 DIAGNOSIS — Z11.3 ENCOUNTER FOR SCREENING FOR INFECTIONS WITH A PREDOMINANTLY SEXUAL MODE OF TRANSMISSION: ICD-10-CM

## 2021-07-01 LAB
BILIRUB UR QL STRIP: NORMAL
CLARITY UR: CLEAR
COLLECTION METHOD: NORMAL
GLUCOSE UR-MCNC: NORMAL
HCG UR QL: 0.2 EU/DL
HGB UR QL STRIP.AUTO: ABNORMAL
KETONES UR-MCNC: NORMAL
LEUKOCYTE ESTERASE UR QL STRIP: NORMAL
NITRITE UR QL STRIP: NORMAL
PH UR STRIP: 5.5
PROT UR STRIP-MCNC: NORMAL
SP GR UR STRIP: 1.02

## 2021-07-01 PROCEDURE — 99214 OFFICE O/P EST MOD 30 MIN: CPT | Mod: 25

## 2021-07-01 PROCEDURE — 81003 URINALYSIS AUTO W/O SCOPE: CPT | Mod: QW

## 2021-07-01 PROCEDURE — 36415 COLL VENOUS BLD VENIPUNCTURE: CPT

## 2021-07-01 NOTE — HISTORY OF PRESENT ILLNESS
[FreeTextEntry1] : Fasting labs, renewal of medication,   [de-identified] : Mr. LARSEN is a 60 year  male, who present to the office for fasting labs and medication renewal \par Requesting a refill on Xanax.  States he takes it for OCTAVIO.  Doing well on current dose.  Denies any side effects to the medications \par Patient Denies chest pain, SOB, VAZQUEZ \par

## 2021-07-01 NOTE — REVIEW OF SYSTEMS
[Fever] : no fever [Recent Change In Weight] : ~T no recent weight change [Vision Problems] : no vision problems [Earache] : no earache [Hearing Loss] : no hearing loss [Nasal Discharge] : no nasal discharge [Sore Throat] : no sore throat [Chest Pain] : no chest pain [Palpitations] : no palpitations [Lower Ext Edema] : no lower extremity edema [Paroxysmal Nocturnal Dyspnea] : no paroxysmal nocturnal dyspnea [Shortness Of Breath] : no shortness of breath [Wheezing] : no wheezing [Cough] : no cough [Dyspnea on Exertion] : not dyspnea on exertion [Abdominal Pain] : no abdominal pain [Vomiting] : no vomiting [Melena] : no melena [Dysuria] : no dysuria [Joint Pain] : no joint pain [Muscle Weakness] : no muscle weakness [Back Pain] : no back pain [Itching] : no itching [Hair Changes] : no hair changes [Skin Rash] : no skin rash [Headache] : no headache [Fainting] : no fainting [Memory Loss] : no memory loss [Swollen Glands] : no swollen glands [Negative] : Gastrointestinal

## 2021-07-01 NOTE — COUNSELING
[Behavioral health counseling provided] : Behavioral health counseling provided [Benefits of weight loss discussed] : Benefits of weight loss discussed [Encouraged to maintain food diary] : Encouraged to maintain food diary [Encouraged to increase physical activity] : Encouraged to increase physical activity [Decrease Portions] : decrease portions [Good understanding] : Patient has a good understanding of disease, goals and obesity follow-up plan [de-identified] : STI counseling

## 2021-07-01 NOTE — PHYSICAL EXAM
[Well Nourished] : well nourished [Well Developed] : well developed [Well-Appearing] : well-appearing [Normal Sclera/Conjunctiva] : normal sclera/conjunctiva [PERRL] : pupils equal round and reactive to light [EOMI] : extraocular movements intact [Normal Outer Ear/Nose] : the outer ears and nose were normal in appearance [Normal Oropharynx] : the oropharynx was normal [Normal Nasal Mucosa] : the nasal mucosa was normal [No JVD] : no jugular venous distention [No Lymphadenopathy] : no lymphadenopathy [Supple] : supple [Thyroid Normal, No Nodules] : the thyroid was normal and there were no nodules present [No Respiratory Distress] : no respiratory distress  [No Accessory Muscle Use] : no accessory muscle use [Clear to Auscultation] : lungs were clear to auscultation bilaterally [Normal Rate] : normal rate  [Regular Rhythm] : with a regular rhythm [Normal S1, S2] : normal S1 and S2 [No Murmur] : no murmur heard [No Carotid Bruits] : no carotid bruits [No Abdominal Bruit] : a ~M bruit was not heard ~T in the abdomen [No Varicosities] : no varicosities [Pedal Pulses Present] : the pedal pulses are present [No Edema] : there was no peripheral edema [No Palpable Aorta] : no palpable aorta [No Extremity Clubbing/Cyanosis] : no extremity clubbing/cyanosis [Soft] : abdomen soft [Non Tender] : non-tender [Non-distended] : non-distended [No Masses] : no abdominal mass palpated [No HSM] : no HSM [Normal Bowel Sounds] : normal bowel sounds [Obese] : obese [Normal Sphincter Tone] : normal sphincter tone [Urethral Meatus] : meatus normal [Penis Abnormality] : normal circumcised penis [Urinary Bladder Findings] : the bladder was normal on palpation [Scrotum] : the scrotum was normal [Rectal Exam - Seminal Vesicles] : the seminal vesicles were normal [Epididymis] : the epididymides were normal [Testes Tenderness] : no tenderness of the testes [Normal Posterior Cervical Nodes] : no posterior cervical lymphadenopathy [Normal Anterior Cervical Nodes] : no anterior cervical lymphadenopathy [No CVA Tenderness] : no CVA  tenderness [No Spinal Tenderness] : no spinal tenderness [No Joint Swelling] : no joint swelling [Grossly Normal Strength/Tone] : grossly normal strength/tone [No Rash] : no rash [Coordination Grossly Intact] : coordination grossly intact [No Focal Deficits] : no focal deficits [Normal Gait] : normal gait [Deep Tendon Reflexes (DTR)] : deep tendon reflexes were 2+ and symmetric [Speech Grossly Normal] : speech grossly normal [Normal Affect] : the affect was normal [Alert and Oriented x3] : oriented to person, place, and time [Normal Mood] : the mood was normal [Normal Insight/Judgement] : insight and judgment were intact [Stool Occult Blood] : stool negative for occult blood [FreeTextEntry1] : small hemorrhoid   [de-identified] : small abrasion left scalp area

## 2021-07-01 NOTE — HEALTH RISK ASSESSMENT
[Yes] : Yes [2 - 4 times a month (2 pts)] : 2-4 times a month (2 points) [1 or 2 (0 pts)] : 1 or 2 (0 points) [Never (0 pts)] : Never (0 points) [No] : In the past 12 months have you used drugs other than those required for medical reasons? No [No falls in past year] : Patient reported no falls in the past year [0] : 2) Feeling down, depressed, or hopeless: Not at all (0) [] : No [YearQuit] : 1985 [Audit-CScore] : 2 [de-identified] : Denies  [de-identified] : Weigh watches  [[id number]] : 0

## 2021-07-01 NOTE — PLAN
[FreeTextEntry1] : Cardio   -hyperlipidemia, obesity -  Patient was advised the importance of participating in aerobic exercise programs improve their stamina.  GILBERT was encourage to start an exercise program.    Renewed medications. \par Check labs \par  Advised weight loss \par \par GI - viral hep B core ab positive-  MRI liver showed hemangioma and liver cyst no hepatocellular carcinoma \par Follow up with GI \par \par Psych - OCTAVIO- Checked -  Refilled xanax.  Counseling given  to the pt \par \par Endo - Hypothyroidism -  Counseling given to the pt.  Continue with current medication \par hyperglycemia- Advised to continue with better eating habits.  Check a1c today \par \par Endocrinology  -  Obesity   Patient was educated about the importance of diet and exercise.   We discussed  a goal of a BMI near 25.   Patient was advised different treatment modalities.\par \par Shingrix - Again  discussed, patient to determine if vaccine is covered under medical benefits of current insurance plan and follow up for 1st dose if interested; otherwise, instructed to follow up at the pharmacy for vaccine \par \par Patient  education  - COVID-19   Counseling and education provided to the patient.  Advised sign and symptoms of the virus.  Advised contact precautions.  Educated patient on proper hand washing and to participate in social distancing.\par Patient is in full awareness of the plan and agrees to it.  All pt question was answered.  \par

## 2021-07-08 ENCOUNTER — NON-APPOINTMENT (OUTPATIENT)
Age: 61
End: 2021-07-08

## 2021-07-13 LAB
25(OH)D3 SERPL-MCNC: 43.6 NG/ML
ALBUMIN SERPL ELPH-MCNC: 4.5 G/DL
ALP BLD-CCNC: 69 U/L
ALT SERPL-CCNC: 27 U/L
ANION GAP SERPL CALC-SCNC: 11 MMOL/L
AST SERPL-CCNC: 19 U/L
BASOPHILS # BLD AUTO: 0.04 K/UL
BASOPHILS NFR BLD AUTO: 0.7 %
BILIRUB SERPL-MCNC: 0.3 MG/DL
BUN SERPL-MCNC: 19 MG/DL
CALCIUM SERPL-MCNC: 9.5 MG/DL
CHLORIDE SERPL-SCNC: 103 MMOL/L
CHOLEST SERPL-MCNC: 138 MG/DL
CO2 SERPL-SCNC: 26 MMOL/L
CREAT SERPL-MCNC: 0.94 MG/DL
EOSINOPHIL # BLD AUTO: 0.19 K/UL
EOSINOPHIL NFR BLD AUTO: 3.3 %
ESTIMATED AVERAGE GLUCOSE: 111 MG/DL
GLUCOSE SERPL-MCNC: 101 MG/DL
HBA1C MFR BLD HPLC: 5.5 %
HCT VFR BLD CALC: 47.5 %
HDLC SERPL-MCNC: 55 MG/DL
HGB BLD-MCNC: 15.2 G/DL
IMM GRANULOCYTES NFR BLD AUTO: 0.3 %
LDLC SERPL CALC-MCNC: 72 MG/DL
LYMPHOCYTES # BLD AUTO: 1.72 K/UL
LYMPHOCYTES NFR BLD AUTO: 29.9 %
MAN DIFF?: NORMAL
MCHC RBC-ENTMCNC: 30.3 PG
MCHC RBC-ENTMCNC: 32 GM/DL
MCV RBC AUTO: 94.8 FL
MONOCYTES # BLD AUTO: 0.57 K/UL
MONOCYTES NFR BLD AUTO: 9.9 %
NEUTROPHILS # BLD AUTO: 3.21 K/UL
NEUTROPHILS NFR BLD AUTO: 55.9 %
NONHDLC SERPL-MCNC: 82 MG/DL
PLATELET # BLD AUTO: 291 K/UL
POTASSIUM SERPL-SCNC: 4.5 MMOL/L
PROT SERPL-MCNC: 6.8 G/DL
RBC # BLD: 5.01 M/UL
RBC # FLD: 14 %
SODIUM SERPL-SCNC: 140 MMOL/L
TRIGL SERPL-MCNC: 50 MG/DL
TSH SERPL-ACNC: 1.41 UIU/ML
WBC # FLD AUTO: 5.75 K/UL

## 2021-07-20 ENCOUNTER — MED ADMIN CHARGE (OUTPATIENT)
Age: 61
End: 2021-07-20

## 2021-07-20 ENCOUNTER — APPOINTMENT (OUTPATIENT)
Dept: INTERNAL MEDICINE | Facility: CLINIC | Age: 61
End: 2021-07-20
Payer: COMMERCIAL

## 2021-07-20 VITALS
OXYGEN SATURATION: 98 % | HEART RATE: 60 BPM | WEIGHT: 234 LBS | TEMPERATURE: 97.2 F | BODY MASS INDEX: 41.46 KG/M2 | DIASTOLIC BLOOD PRESSURE: 86 MMHG | HEIGHT: 63 IN | SYSTOLIC BLOOD PRESSURE: 122 MMHG

## 2021-07-20 DIAGNOSIS — Z71.89 OTHER SPECIFIED COUNSELING: ICD-10-CM

## 2021-07-20 DIAGNOSIS — Z20.822 CONTACT WITH AND (SUSPECTED) EXPOSURE TO COVID-19: ICD-10-CM

## 2021-07-20 PROCEDURE — 99213 OFFICE O/P EST LOW 20 MIN: CPT | Mod: 25

## 2021-07-20 PROCEDURE — 90471 IMMUNIZATION ADMIN: CPT

## 2021-07-20 PROCEDURE — 90750 HZV VACC RECOMBINANT IM: CPT

## 2021-07-21 PROBLEM — Z71.89 EDUCATED ABOUT COVID-19 VIRUS INFECTION: Status: RESOLVED | Noted: 2020-06-10 | Resolved: 2021-07-21

## 2021-07-21 PROBLEM — Z20.822 ENCOUNTER FOR LABORATORY TESTING FOR COVID-19 VIRUS: Status: RESOLVED | Noted: 2020-06-10 | Resolved: 2021-07-21

## 2021-07-21 NOTE — PHYSICAL EXAM
[Well Nourished] : well nourished [Well Developed] : well developed [Well-Appearing] : well-appearing [Normal Sclera/Conjunctiva] : normal sclera/conjunctiva [PERRL] : pupils equal round and reactive to light [EOMI] : extraocular movements intact [Normal Outer Ear/Nose] : the outer ears and nose were normal in appearance [Normal Oropharynx] : the oropharynx was normal [Normal Nasal Mucosa] : the nasal mucosa was normal [No JVD] : no jugular venous distention [No Lymphadenopathy] : no lymphadenopathy [Supple] : supple [Thyroid Normal, No Nodules] : the thyroid was normal and there were no nodules present [No Respiratory Distress] : no respiratory distress  [No Accessory Muscle Use] : no accessory muscle use [Clear to Auscultation] : lungs were clear to auscultation bilaterally [Normal Rate] : normal rate  [Regular Rhythm] : with a regular rhythm [Normal S1, S2] : normal S1 and S2 [No Murmur] : no murmur heard [No Carotid Bruits] : no carotid bruits [No Abdominal Bruit] : a ~M bruit was not heard ~T in the abdomen [No Varicosities] : no varicosities [Pedal Pulses Present] : the pedal pulses are present [No Edema] : there was no peripheral edema [No Palpable Aorta] : no palpable aorta [No Extremity Clubbing/Cyanosis] : no extremity clubbing/cyanosis [Soft] : abdomen soft [Non Tender] : non-tender [Non-distended] : non-distended [No Masses] : no abdominal mass palpated [No HSM] : no HSM [Normal Bowel Sounds] : normal bowel sounds [Obese] : obese [Normal Sphincter Tone] : normal sphincter tone [Urethral Meatus] : meatus normal [Penis Abnormality] : normal circumcised penis [Urinary Bladder Findings] : the bladder was normal on palpation [Scrotum] : the scrotum was normal [Rectal Exam - Seminal Vesicles] : the seminal vesicles were normal [Epididymis] : the epididymides were normal [Testes Tenderness] : no tenderness of the testes [Normal Posterior Cervical Nodes] : no posterior cervical lymphadenopathy [Normal Anterior Cervical Nodes] : no anterior cervical lymphadenopathy [No CVA Tenderness] : no CVA  tenderness [No Spinal Tenderness] : no spinal tenderness [No Joint Swelling] : no joint swelling [Grossly Normal Strength/Tone] : grossly normal strength/tone [No Rash] : no rash [Coordination Grossly Intact] : coordination grossly intact [No Focal Deficits] : no focal deficits [Normal Gait] : normal gait [Deep Tendon Reflexes (DTR)] : deep tendon reflexes were 2+ and symmetric [Speech Grossly Normal] : speech grossly normal [Normal Affect] : the affect was normal [Alert and Oriented x3] : oriented to person, place, and time [Normal Mood] : the mood was normal [Normal Insight/Judgement] : insight and judgment were intact [Stool Occult Blood] : stool negative for occult blood [FreeTextEntry1] : small hemorrhoid   [de-identified] : small abrasion left scalp area

## 2021-07-21 NOTE — COUNSELING
[Behavioral health counseling provided] : Behavioral health counseling provided [Benefits of weight loss discussed] : Benefits of weight loss discussed [Encouraged to maintain food diary] : Encouraged to maintain food diary [Encouraged to increase physical activity] : Encouraged to increase physical activity [Decrease Portions] : decrease portions [Good understanding] : Patient has a good understanding of disease, goals and obesity follow-up plan [de-identified] : STI counseling

## 2021-07-21 NOTE — ASSESSMENT
[FreeTextEntry1] : Mr. LARSEN is a 60 year  male, who present to the office for vaccination up date

## 2021-07-21 NOTE — HISTORY OF PRESENT ILLNESS
[FreeTextEntry1] : Shingrix shot  [de-identified] : Mr. LARSEN is a 60 year  male, who present to the office for for immunization update.  Pt states he general feels well.  Would like to get the Shingrix vax.  Denies any adverse effects to prior vaccination

## 2021-07-21 NOTE — PLAN
[FreeTextEntry1] : Immunization   Shingrix vax # 1 given - Consent given-  Advised side effects \par \par Cardio   -hyperlipidemia, obesity -  Patient was advised the importance of participating in aerobic exercise programs improve their stamina.  GILBERT was encourage to start an exercise program.     \par  Advised weight loss \par  Check FLP in 3 months \par \par Psych - OCTAVIO- Continue with  xanax.  Counseling given  to the pt \par \par Endo - Hypothyroidism -  Counseling given to the pt.  Continue with current medication \par hyperglycemia- Advised to continue with better eating habits.  \par \par Endocrinology  -  Obesity   Patient was educated about the importance of diet and exercise.   We discussed  a goal of a BMI near 25.   Patient was advised different treatment modalities.\par \par Patient  education  - COVID-19   Counseling and education provided to the patient.  Advised sign and symptoms of the virus.  Advised contact precautions.  Educated patient on proper hand washing and to participate in social distancing.\par Patient is in full awareness of the plan and agrees to it.  All pt question was answered.  \par

## 2021-07-21 NOTE — HEALTH RISK ASSESSMENT
[Yes] : Yes [2 - 4 times a month (2 pts)] : 2-4 times a month (2 points) [1 or 2 (0 pts)] : 1 or 2 (0 points) [Never (0 pts)] : Never (0 points) [No] : In the past 12 months have you used drugs other than those required for medical reasons? No [No falls in past year] : Patient reported no falls in the past year [0] : 2) Feeling down, depressed, or hopeless: Not at all (0) [] : No [YearQuit] : 1985 [Audit-CScore] : 2 [de-identified] : Denies  [de-identified] : Weigh watches  [[id number]] : 0

## 2021-08-10 ENCOUNTER — TRANSCRIPTION ENCOUNTER (OUTPATIENT)
Age: 61
End: 2021-08-10

## 2021-08-18 DIAGNOSIS — R06.83 SNORING: ICD-10-CM

## 2021-08-31 ENCOUNTER — APPOINTMENT (OUTPATIENT)
Dept: OTOLARYNGOLOGY | Facility: CLINIC | Age: 61
End: 2021-08-31
Payer: COMMERCIAL

## 2021-08-31 VITALS
SYSTOLIC BLOOD PRESSURE: 109 MMHG | WEIGHT: 222 LBS | HEART RATE: 67 BPM | BODY MASS INDEX: 39.34 KG/M2 | HEIGHT: 63 IN | DIASTOLIC BLOOD PRESSURE: 69 MMHG

## 2021-08-31 DIAGNOSIS — J34.89 OTHER SPECIFIED DISORDERS OF NOSE AND NASAL SINUSES: ICD-10-CM

## 2021-08-31 DIAGNOSIS — D10.1 BENIGN NEOPLASM OF TONGUE: ICD-10-CM

## 2021-08-31 DIAGNOSIS — R06.83 SNORING: ICD-10-CM

## 2021-08-31 DIAGNOSIS — J31.0 CHRONIC RHINITIS: ICD-10-CM

## 2021-08-31 DIAGNOSIS — J34.2 DEVIATED NASAL SEPTUM: ICD-10-CM

## 2021-08-31 DIAGNOSIS — G47.33 OBSTRUCTIVE SLEEP APNEA (ADULT) (PEDIATRIC): ICD-10-CM

## 2021-08-31 DIAGNOSIS — R09.82 POSTNASAL DRIP: ICD-10-CM

## 2021-08-31 PROCEDURE — 99204 OFFICE O/P NEW MOD 45 MIN: CPT | Mod: 25

## 2021-08-31 PROCEDURE — 31575 DIAGNOSTIC LARYNGOSCOPY: CPT

## 2021-08-31 RX ORDER — SILDENAFIL 50 MG/1
50 TABLET ORAL
Qty: 8 | Refills: 0 | Status: COMPLETED | COMMUNITY
Start: 2021-01-27 | End: 2021-08-31

## 2021-08-31 RX ORDER — HYDROXYZINE HYDROCHLORIDE 25 MG/1
25 TABLET ORAL
Qty: 360 | Refills: 0 | Status: COMPLETED | COMMUNITY
Start: 2018-06-27 | End: 2021-08-31

## 2021-08-31 NOTE — HISTORY OF PRESENT ILLNESS
[de-identified] : The patient presents today in regards to his snoring, PND, and for an ear check. Pt has been dealing with PND for some time now, with gustatory rhinitis. Pt has taken antihistamines but it has not helped the PND. Pt has had no work up for seasonal allergies, only food allergies. Pt's girlfriend is concerned that pt might have sleep apnea. Pt wakes up tired but does not wake up with dry mouth or HA. Pt has never had sleep study. Size 18.5 neck. Pt is a q-tip user but has no itching or clogging. Has h/o noise exposure.

## 2021-08-31 NOTE — CONSULT LETTER
[Dear  ___] : Dear  [unfilled], [Consult Letter:] : I had the pleasure of evaluating your patient, [unfilled]. [Please see my note below.] : Please see my note below. [Consult Closing:] : Thank you very much for allowing me to participate in the care of this patient.  If you have any questions, please do not hesitate to contact me. [Sincerely,] : Sincerely, [FreeTextEntry3] : Dr. Eze Joya MD FACS

## 2021-08-31 NOTE — PHYSICAL EXAM
[Midline] : trachea located in midline position [Normal] : no rashes [de-identified] : 1 [FreeTextEntry1] : bilateral deviated septum- right worse than left, mild obstruction b/l. mild turbinate hypertrophy. little mucus in nose\par \par  [de-identified] : fibroma of tongue anteriorly [de-identified] : type 3 oral cavity [FreeTextEntry2] : Sinuses nontender to percussion. Sensation intact. [de-identified] : PERRL

## 2021-08-31 NOTE — REVIEW OF SYSTEMS
[Post Nasal Drip] : post nasal drip [Snoring With Pauses] : snoring with pauses [Throat Clearing] : throat clearing [Anxiety] : anxiety [Negative] : Heme/Lymph

## 2021-08-31 NOTE — PROCEDURE
[Complicated Symptoms] : complicated symptoms requiring more thorough examination than provided by mirror [de-identified] : Procedure: Flexible Fiberoptic Laryngoscopy: Risk, benefits and alternatives of flexible endoscopy were explained to the patient. The patient gave oral consent to proceed. The flexible scope was inserted into the right nasal cavity and advanced towards the nasopharynx. Visualized mucosa over the turbinates and septum were as described above. The nasopharynx was clear. Oropharyngeal walls were symmetric and mobile without lesion, mass or edema. Complete obstruction at level of soft palate and uvula. Hypopharynx was also without lesion or edema. Larynx was mobile without lesions. Supraglottic structures were free of edema, mass and asymmetry. True vocal folds were white without mass or lesion. Base of tongue was within normal limits.

## 2021-08-31 NOTE — ADDENDUM
[FreeTextEntry1] : Documented by Nolan Arguello acting as a scribe for Dr. Eze Joya on (08/31/2021).\par \par All medical record entries made by the Scribe were at my, Dr. Eze Joya's, direction and personally dictated by me on (08/31/2021). I have reviewed the chart and agree that the record accurately reflects my personal performance of the history, physical exam, assessment and plan. I have also personally directed, reviewed, and agree with the discharge instructions.\par \par

## 2021-08-31 NOTE — ASSESSMENT
[FreeTextEntry1] : Reviewed and reconciled medications, allergies, PMHx, PSHx, SocHx, FMHx. \par \par gustatory rhinitis \par fibroma of tongue anteriorly\par type 3 oral cavity\par snoring\par Complete obstruction at level of soft palate and uvula\par bilateral deviated septum- right worse than left, mild obstruction \par b/l mild turbinate hypertrophy. \par \par Plan:\par Flexible Fiberoptic Laryngoscopy. Atrovent- 2 sprays both sides up to 4 times a day, before meals and bed, spray laterally towards corner of eyes. Can reduce if sx improve. Sleep study. Stop use of q-tips. FU after test. \par

## 2021-09-17 ENCOUNTER — RX RENEWAL (OUTPATIENT)
Age: 61
End: 2021-09-17

## 2021-10-01 ENCOUNTER — APPOINTMENT (OUTPATIENT)
Dept: INTERNAL MEDICINE | Facility: CLINIC | Age: 61
End: 2021-10-01
Payer: COMMERCIAL

## 2021-10-01 ENCOUNTER — MED ADMIN CHARGE (OUTPATIENT)
Age: 61
End: 2021-10-01

## 2021-10-01 VITALS
DIASTOLIC BLOOD PRESSURE: 70 MMHG | TEMPERATURE: 97.3 F | SYSTOLIC BLOOD PRESSURE: 110 MMHG | HEART RATE: 65 BPM | WEIGHT: 220.2 LBS | RESPIRATION RATE: 16 BRPM | HEIGHT: 63 IN | OXYGEN SATURATION: 97 % | BODY MASS INDEX: 39.02 KG/M2

## 2021-10-01 DIAGNOSIS — R40.0 SOMNOLENCE: ICD-10-CM

## 2021-10-01 PROCEDURE — 99214 OFFICE O/P EST MOD 30 MIN: CPT | Mod: 25

## 2021-10-01 PROCEDURE — 36415 COLL VENOUS BLD VENIPUNCTURE: CPT

## 2021-10-01 PROCEDURE — G0008: CPT

## 2021-10-01 PROCEDURE — 90686 IIV4 VACC NO PRSV 0.5 ML IM: CPT

## 2021-10-02 NOTE — PLAN
[FreeTextEntry1] : Immunization   Shingrix vax # 2 is due pt will return in 2 weeks for the 2nd dose.  Defer shot at the office today\par Flu shot given - consent given, education on vax given.\par \par Cardio   -hyperlipidemia, obesity -  Patient was advised the importance of participating in aerobic exercise programs improve their stamina.  GILBERT was encourage to start an exercise program.     \par  Advised weight loss. Refilled medication-  Check FLP.LFT \par \par Pulm - Pending TRAY testing - reques copy of the results - Discussed treatment options \par \par Psych - OCTAVIO- Continue with  xanax.  Counseling given  to the pt - reviewed - refilled medication\par advised abuse potential \par \par Endo - Hypothyroidism -  Counseling given to the pt.  Continue with current medication -- check labs - refilled medication\par hyperglycemia- Advised to continue with better eating habits.  Check 1c \par \par Endocrinology  -  Obesity   Patient was educated about the importance of diet and exercise.   We discussed  a goal of a BMI near 25.   Patient was advised different treatment modalities.\par \par Patient  education  - COVID-19   Counseling and education provided to the patient.  Advised sign and symptoms of the virus.  Advised contact precautions.  Educated patient on proper hand washing and to participate in social distancing. discussed booster shots\par Patient is in full awareness of the plan and agrees to it.  All pt question was answered.  \par

## 2021-10-02 NOTE — ASSESSMENT
[FreeTextEntry1] : Mr. LARSEN is a 60 year  male, who present to the office for vaccination up date, fasting lab, medication renewal and general check up

## 2021-10-02 NOTE — HISTORY OF PRESENT ILLNESS
[FreeTextEntry1] : Medication renewal and fasting labs, and flu shot  [de-identified] : Mr. LARSEN is a 60 year  male, who present to the office for medication renewal and a check up.\par General feels well.  Requesting renewal of medications, denies any side effects to them.  \par States he traveling overseas and would like a flu shot today\par Denies any recent infections \par Saw ENT going for a sleep study

## 2021-10-02 NOTE — COUNSELING
[Behavioral health counseling provided] : Behavioral health counseling provided [Benefits of weight loss discussed] : Benefits of weight loss discussed [Encouraged to maintain food diary] : Encouraged to maintain food diary [Encouraged to increase physical activity] : Encouraged to increase physical activity [Decrease Portions] : decrease portions [Good understanding] : Patient has a good understanding of disease, goals and obesity follow-up plan [de-identified] : STI counseling

## 2021-10-02 NOTE — HEALTH RISK ASSESSMENT
[Yes] : Yes [No] : In the past 12 months have you used drugs other than those required for medical reasons? No [No falls in past year] : Patient reported no falls in the past year [2 - 3 times a week (3 pts)] : 2 - 3  times a week (3 points) [1 or 2 (0 pts)] : 1 or 2 (0 points) [Never (0 pts)] : Never (0 points) [0] : 2) Feeling down, depressed, or hopeless: Not at all (0) [PHQ-2 Negative - No further assessment needed] : PHQ-2 Negative - No further assessment needed [] : No [YearQuit] : 1985 [Audit-CScore] : 3 [de-identified] : Denies  [de-identified] : Weigh watches  [[id number]] : 0

## 2021-10-02 NOTE — PHYSICAL EXAM
[Well Nourished] : well nourished [Well Developed] : well developed [Well-Appearing] : well-appearing [Normal Sclera/Conjunctiva] : normal sclera/conjunctiva [PERRL] : pupils equal round and reactive to light [EOMI] : extraocular movements intact [Normal Outer Ear/Nose] : the outer ears and nose were normal in appearance [Normal Oropharynx] : the oropharynx was normal [Normal Nasal Mucosa] : the nasal mucosa was normal [No JVD] : no jugular venous distention [No Lymphadenopathy] : no lymphadenopathy [Supple] : supple [Thyroid Normal, No Nodules] : the thyroid was normal and there were no nodules present [No Respiratory Distress] : no respiratory distress  [No Accessory Muscle Use] : no accessory muscle use [Clear to Auscultation] : lungs were clear to auscultation bilaterally [Normal Rate] : normal rate  [Regular Rhythm] : with a regular rhythm [Normal S1, S2] : normal S1 and S2 [No Murmur] : no murmur heard [No Carotid Bruits] : no carotid bruits [No Abdominal Bruit] : a ~M bruit was not heard ~T in the abdomen [No Varicosities] : no varicosities [Pedal Pulses Present] : the pedal pulses are present [No Edema] : there was no peripheral edema [No Palpable Aorta] : no palpable aorta [No Extremity Clubbing/Cyanosis] : no extremity clubbing/cyanosis [Soft] : abdomen soft [Non Tender] : non-tender [Non-distended] : non-distended [No Masses] : no abdominal mass palpated [No HSM] : no HSM [Normal Bowel Sounds] : normal bowel sounds [Obese] : obese [Normal Sphincter Tone] : normal sphincter tone [Penis Abnormality] : normal circumcised penis [Urethral Meatus] : meatus normal [Urinary Bladder Findings] : the bladder was normal on palpation [Scrotum] : the scrotum was normal [Rectal Exam - Seminal Vesicles] : the seminal vesicles were normal [Epididymis] : the epididymides were normal [Testes Tenderness] : no tenderness of the testes [Normal Posterior Cervical Nodes] : no posterior cervical lymphadenopathy [Normal Anterior Cervical Nodes] : no anterior cervical lymphadenopathy [No CVA Tenderness] : no CVA  tenderness [No Spinal Tenderness] : no spinal tenderness [No Joint Swelling] : no joint swelling [Grossly Normal Strength/Tone] : grossly normal strength/tone [No Rash] : no rash [Coordination Grossly Intact] : coordination grossly intact [No Focal Deficits] : no focal deficits [Normal Gait] : normal gait [Deep Tendon Reflexes (DTR)] : deep tendon reflexes were 2+ and symmetric [Speech Grossly Normal] : speech grossly normal [Normal Affect] : the affect was normal [Alert and Oriented x3] : oriented to person, place, and time [Normal Mood] : the mood was normal [Normal Insight/Judgement] : insight and judgment were intact [Stool Occult Blood] : stool negative for occult blood [FreeTextEntry1] : small hemorrhoid   [de-identified] : small abrasion left scalp area

## 2021-10-03 ENCOUNTER — TRANSCRIPTION ENCOUNTER (OUTPATIENT)
Age: 61
End: 2021-10-03

## 2021-10-13 LAB
25(OH)D3 SERPL-MCNC: 51.7 NG/ML
ALBUMIN SERPL ELPH-MCNC: 4.6 G/DL
ALP BLD-CCNC: 71 U/L
ALT SERPL-CCNC: 13 U/L
ANION GAP SERPL CALC-SCNC: 12 MMOL/L
AST SERPL-CCNC: 16 U/L
BASOPHILS # BLD AUTO: 0.03 K/UL
BASOPHILS NFR BLD AUTO: 0.5 %
BILIRUB SERPL-MCNC: 0.3 MG/DL
BUN SERPL-MCNC: 14 MG/DL
CALCIUM SERPL-MCNC: 9.6 MG/DL
CHLORIDE SERPL-SCNC: 102 MMOL/L
CHOLEST SERPL-MCNC: 137 MG/DL
CO2 SERPL-SCNC: 26 MMOL/L
CREAT SERPL-MCNC: 0.92 MG/DL
EOSINOPHIL # BLD AUTO: 0.15 K/UL
EOSINOPHIL NFR BLD AUTO: 2.5 %
ESTIMATED AVERAGE GLUCOSE: 114 MG/DL
GLUCOSE SERPL-MCNC: 98 MG/DL
HBA1C MFR BLD HPLC: 5.6 %
HCT VFR BLD CALC: 48.2 %
HDLC SERPL-MCNC: 55 MG/DL
HGB BLD-MCNC: 15.7 G/DL
IMM GRANULOCYTES NFR BLD AUTO: 0.3 %
LDLC SERPL CALC-MCNC: 73 MG/DL
LYMPHOCYTES # BLD AUTO: 1.48 K/UL
LYMPHOCYTES NFR BLD AUTO: 24.6 %
MAN DIFF?: NORMAL
MCHC RBC-ENTMCNC: 30 PG
MCHC RBC-ENTMCNC: 32.6 GM/DL
MCV RBC AUTO: 92.2 FL
MONOCYTES # BLD AUTO: 0.53 K/UL
MONOCYTES NFR BLD AUTO: 8.8 %
NEUTROPHILS # BLD AUTO: 3.8 K/UL
NEUTROPHILS NFR BLD AUTO: 63.3 %
NONHDLC SERPL-MCNC: 83 MG/DL
PLATELET # BLD AUTO: 302 K/UL
POTASSIUM SERPL-SCNC: 4.8 MMOL/L
PROT SERPL-MCNC: 7.1 G/DL
RBC # BLD: 5.23 M/UL
RBC # FLD: 13.8 %
SODIUM SERPL-SCNC: 140 MMOL/L
TESTOST BND SERPL-MCNC: 6.6 PG/ML
TESTOSTERONE TOTAL S: 464 NG/DL
TRIGL SERPL-MCNC: 51 MG/DL
TSH SERPL-ACNC: 1.45 UIU/ML
WBC # FLD AUTO: 6.01 K/UL

## 2021-10-19 ENCOUNTER — APPOINTMENT (OUTPATIENT)
Dept: SLEEP CENTER | Facility: CLINIC | Age: 61
End: 2021-10-19

## 2021-10-20 ENCOUNTER — APPOINTMENT (OUTPATIENT)
Dept: INTERNAL MEDICINE | Facility: CLINIC | Age: 61
End: 2021-10-20
Payer: COMMERCIAL

## 2021-10-20 ENCOUNTER — MED ADMIN CHARGE (OUTPATIENT)
Age: 61
End: 2021-10-20

## 2021-10-20 VITALS
SYSTOLIC BLOOD PRESSURE: 110 MMHG | HEIGHT: 63 IN | OXYGEN SATURATION: 97 % | TEMPERATURE: 98.1 F | HEART RATE: 68 BPM | WEIGHT: 220 LBS | DIASTOLIC BLOOD PRESSURE: 70 MMHG | BODY MASS INDEX: 38.98 KG/M2 | RESPIRATION RATE: 16 BRPM

## 2021-10-20 DIAGNOSIS — L71.9 ROSACEA, UNSPECIFIED: ICD-10-CM

## 2021-10-20 PROCEDURE — 90750 HZV VACC RECOMBINANT IM: CPT

## 2021-10-20 PROCEDURE — 90471 IMMUNIZATION ADMIN: CPT

## 2021-10-20 PROCEDURE — 99214 OFFICE O/P EST MOD 30 MIN: CPT | Mod: 25

## 2021-10-20 NOTE — PHYSICAL EXAM
[Well Nourished] : well nourished [Well Developed] : well developed [Well-Appearing] : well-appearing [Normal Sclera/Conjunctiva] : normal sclera/conjunctiva [PERRL] : pupils equal round and reactive to light [EOMI] : extraocular movements intact [Normal Outer Ear/Nose] : the outer ears and nose were normal in appearance [Normal Oropharynx] : the oropharynx was normal [Normal Nasal Mucosa] : the nasal mucosa was normal [No JVD] : no jugular venous distention [No Lymphadenopathy] : no lymphadenopathy [Supple] : supple [Thyroid Normal, No Nodules] : the thyroid was normal and there were no nodules present [No Respiratory Distress] : no respiratory distress  [No Accessory Muscle Use] : no accessory muscle use [Clear to Auscultation] : lungs were clear to auscultation bilaterally [Normal Rate] : normal rate  [Regular Rhythm] : with a regular rhythm [Normal S1, S2] : normal S1 and S2 [No Murmur] : no murmur heard [No Carotid Bruits] : no carotid bruits [No Abdominal Bruit] : a ~M bruit was not heard ~T in the abdomen [No Varicosities] : no varicosities [Pedal Pulses Present] : the pedal pulses are present [No Edema] : there was no peripheral edema [No Palpable Aorta] : no palpable aorta [No Extremity Clubbing/Cyanosis] : no extremity clubbing/cyanosis [Soft] : abdomen soft [Non Tender] : non-tender [Non-distended] : non-distended [No Masses] : no abdominal mass palpated [No HSM] : no HSM [Normal Bowel Sounds] : normal bowel sounds [Obese] : obese [Normal Sphincter Tone] : normal sphincter tone [Urethral Meatus] : meatus normal [Penis Abnormality] : normal circumcised penis [Urinary Bladder Findings] : the bladder was normal on palpation [Scrotum] : the scrotum was normal [Rectal Exam - Seminal Vesicles] : the seminal vesicles were normal [Epididymis] : the epididymides were normal [Testes Tenderness] : no tenderness of the testes [Normal Posterior Cervical Nodes] : no posterior cervical lymphadenopathy [Normal Anterior Cervical Nodes] : no anterior cervical lymphadenopathy [No CVA Tenderness] : no CVA  tenderness [No Spinal Tenderness] : no spinal tenderness [No Joint Swelling] : no joint swelling [Grossly Normal Strength/Tone] : grossly normal strength/tone [No Rash] : no rash [Coordination Grossly Intact] : coordination grossly intact [No Focal Deficits] : no focal deficits [Normal Gait] : normal gait [Deep Tendon Reflexes (DTR)] : deep tendon reflexes were 2+ and symmetric [Speech Grossly Normal] : speech grossly normal [Normal Affect] : the affect was normal [Alert and Oriented x3] : oriented to person, place, and time [Normal Mood] : the mood was normal [Normal Insight/Judgement] : insight and judgment were intact [Stool Occult Blood] : stool negative for occult blood [FreeTextEntry1] : small hemorrhoid   [de-identified] : small abrasion left scalp area

## 2021-10-20 NOTE — HISTORY OF PRESENT ILLNESS
[FreeTextEntry1] : Review of recent labs and shingrix # 2 vax  [de-identified] : Mr. LARSEN is a 60 year  male, who present to the office for general check up and review of recent labs.  States he general feels well. \chintan Recently traveled outside the [place]-  States he had a covid test upon return and was negative.  \chintan Denies fever or chills\par Has noticed redness o the side of his nose and b/l cheek area.  Unaware of what makes it better or worse.  \par \chintan Would like the 2nd dose of shingrix

## 2021-10-20 NOTE — PLAN
[FreeTextEntry1] : Immunization   Shingrix vax # 2  was given, consent formed signed \par \par Cardio   -hyperlipidemia, obesity -  Patient was advised the importance of participating in aerobic exercise programs improve their stamina.  GILBERT was encourage to start an exercise program.     \par  Advised weight loss. Refilled medication- \par \par Dermatology GILBERT was encouraged to wear sun protective clothing, sun block, and proper use of SPF board brim hats, to seek shade and to avoid the sun in peak hours.  ABCD of skin moles was advised. \par Acne rosacea - follow up with derm for a full body exam - trial metrogel \par \par Psych - OCTAVIO- Continue with  xanax.  Counseling given  to the pt - reviewed -\par advised abuse potential \par \par Endo - Hypothyroidism -  Counseling given to the pt.  Continue with current medication -\par discussed  generic vs branded medication\par hyperglycemia- Advised to continue with better eating habits.  Check 1c \par \par Endocrinology  -  Obesity  BMI 38  Patient was educated about the importance of diet and exercise.   We discussed  a goal of a BMI near 25.   Patient was advised different treatment modalities.\par discussed short term goal for weight loss.  Advised to get a BMI 25 weight should be under 145 \par \par Patient  education  - COVID-19   Counseling and education provided to the patient.  Advised sign and symptoms of the virus.  Advised contact precautions.  Educated patient on proper hand washing and to participate in social distancing. discussed booster shots\par Patient is in full awareness of the plan and agrees to it.  All pt question was answered.  \par

## 2021-10-20 NOTE — HEALTH RISK ASSESSMENT
[Yes] : Yes [2 - 3 times a week (3 pts)] : 2 - 3  times a week (3 points) [1 or 2 (0 pts)] : 1 or 2 (0 points) [Never (0 pts)] : Never (0 points) [No] : In the past 12 months have you used drugs other than those required for medical reasons? No [No falls in past year] : Patient reported no falls in the past year [0] : 2) Feeling down, depressed, or hopeless: Not at all (0) [PHQ-2 Negative - No further assessment needed] : PHQ-2 Negative - No further assessment needed [] : No [YearQuit] : 1985 [Audit-CScore] : 3 [de-identified] : Denies  [de-identified] : Weigh watches  [[id number]] : 0

## 2021-10-20 NOTE — REVIEW OF SYSTEMS
[Negative] : Heme/Lymph [Fever] : no fever [Recent Change In Weight] : ~T no recent weight change [Vision Problems] : no vision problems [Earache] : no earache [Hearing Loss] : no hearing loss [Nasal Discharge] : no nasal discharge [Sore Throat] : no sore throat [Chest Pain] : no chest pain [Palpitations] : no palpitations [Lower Ext Edema] : no lower extremity edema [Paroxysmal Nocturnal Dyspnea] : no paroxysmal nocturnal dyspnea [Shortness Of Breath] : no shortness of breath [Wheezing] : no wheezing [Cough] : no cough [Dyspnea on Exertion] : not dyspnea on exertion [Abdominal Pain] : no abdominal pain [Vomiting] : no vomiting [Melena] : no melena [Dysuria] : no dysuria [Joint Pain] : no joint pain [Muscle Weakness] : no muscle weakness [Back Pain] : no back pain [Itching] : no itching [Hair Changes] : no hair changes [Skin Rash] : skin rash [Headache] : no headache [Fainting] : no fainting [Memory Loss] : no memory loss [Swollen Glands] : no swollen glands

## 2021-10-20 NOTE — ASSESSMENT
[FreeTextEntry1] : Mr. LARSEN is a 60 year  male, who present to the office for vaccination up date, facial rash

## 2021-10-20 NOTE — COUNSELING
[Behavioral health counseling provided] : Behavioral health counseling provided [Benefits of weight loss discussed] : Benefits of weight loss discussed [Encouraged to maintain food diary] : Encouraged to maintain food diary [Encouraged to increase physical activity] : Encouraged to increase physical activity [Decrease Portions] : decrease portions [Good understanding] : Patient has a good understanding of disease, goals and obesity follow-up plan [de-identified] : STI counseling

## 2021-11-08 ENCOUNTER — RESULT REVIEW (OUTPATIENT)
Age: 61
End: 2021-11-08

## 2021-12-27 ENCOUNTER — RX RENEWAL (OUTPATIENT)
Age: 61
End: 2021-12-27

## 2022-01-14 ENCOUNTER — RX RENEWAL (OUTPATIENT)
Age: 62
End: 2022-01-14

## 2022-01-20 ENCOUNTER — APPOINTMENT (OUTPATIENT)
Dept: DERMATOLOGY | Facility: CLINIC | Age: 62
End: 2022-01-20
Payer: COMMERCIAL

## 2022-01-20 VITALS — HEIGHT: 63 IN | BODY MASS INDEX: 37.92 KG/M2 | WEIGHT: 214 LBS

## 2022-01-20 DIAGNOSIS — L57.0 ACTINIC KERATOSIS: ICD-10-CM

## 2022-01-20 DIAGNOSIS — Z12.83 ENCOUNTER FOR SCREENING FOR MALIGNANT NEOPLASM OF SKIN: ICD-10-CM

## 2022-01-20 DIAGNOSIS — L81.4 OTHER MELANIN HYPERPIGMENTATION: ICD-10-CM

## 2022-01-20 DIAGNOSIS — L28.0 LICHEN SIMPLEX CHRONICUS: ICD-10-CM

## 2022-01-20 DIAGNOSIS — L21.9 SEBORRHEIC DERMATITIS, UNSPECIFIED: ICD-10-CM

## 2022-01-20 PROCEDURE — 99204 OFFICE O/P NEW MOD 45 MIN: CPT | Mod: 25

## 2022-01-20 PROCEDURE — 17003 DESTRUCT PREMALG LES 2-14: CPT

## 2022-01-20 PROCEDURE — 17000 DESTRUCT PREMALG LESION: CPT

## 2022-01-20 RX ORDER — HYDROCORTISONE 25 MG/G
2.5 OINTMENT TOPICAL
Qty: 1 | Refills: 1 | Status: ACTIVE | COMMUNITY
Start: 2022-01-20 | End: 1900-01-01

## 2022-01-24 ENCOUNTER — APPOINTMENT (OUTPATIENT)
Dept: INTERNAL MEDICINE | Facility: CLINIC | Age: 62
End: 2022-01-24
Payer: COMMERCIAL

## 2022-01-24 VITALS
RESPIRATION RATE: 16 BRPM | BODY MASS INDEX: 39.16 KG/M2 | HEIGHT: 63 IN | HEART RATE: 93 BPM | SYSTOLIC BLOOD PRESSURE: 112 MMHG | OXYGEN SATURATION: 98 % | WEIGHT: 221 LBS | DIASTOLIC BLOOD PRESSURE: 68 MMHG | TEMPERATURE: 98.2 F

## 2022-01-24 DIAGNOSIS — Z87.898 PERSONAL HISTORY OF OTHER SPECIFIED CONDITIONS: ICD-10-CM

## 2022-01-24 DIAGNOSIS — Z13.21 ENCOUNTER FOR SCREENING FOR NUTRITIONAL DISORDER: ICD-10-CM

## 2022-01-24 LAB
BILIRUB UR QL STRIP: NORMAL
CLARITY UR: CLEAR
COLLECTION METHOD: NORMAL
GLUCOSE UR-MCNC: NORMAL
HCG UR QL: 0.2 EU/DL
HGB UR QL STRIP.AUTO: NORMAL
KETONES UR-MCNC: NORMAL
LEUKOCYTE ESTERASE UR QL STRIP: NORMAL
NITRITE UR QL STRIP: NORMAL
PH UR STRIP: 5.5
PROT UR STRIP-MCNC: NORMAL
SP GR UR STRIP: >=1.03

## 2022-01-24 PROCEDURE — 99214 OFFICE O/P EST MOD 30 MIN: CPT | Mod: 25

## 2022-01-24 PROCEDURE — 81003 URINALYSIS AUTO W/O SCOPE: CPT | Mod: QW

## 2022-01-24 NOTE — HEALTH RISK ASSESSMENT
[Yes] : Yes [2 - 3 times a week (3 pts)] : 2 - 3  times a week (3 points) [1 or 2 (0 pts)] : 1 or 2 (0 points) [Never (0 pts)] : Never (0 points) [No] : In the past 12 months have you used drugs other than those required for medical reasons? No [No falls in past year] : Patient reported no falls in the past year [0] : 2) Feeling down, depressed, or hopeless: Not at all (0) [PHQ-2 Negative - No further assessment needed] : PHQ-2 Negative - No further assessment needed [YearQuit] : 1985 [Audit-CScore] : 3 [de-identified] : Denies  [de-identified] : Weigh watches  [[id number]] : 0

## 2022-01-24 NOTE — COUNSELING
[Behavioral health counseling provided] : Behavioral health counseling provided [Benefits of weight loss discussed] : Benefits of weight loss discussed [Encouraged to maintain food diary] : Encouraged to maintain food diary [Encouraged to increase physical activity] : Encouraged to increase physical activity [Decrease Portions] : decrease portions [Good understanding] : Patient has a good understanding of disease, goals and obesity follow-up plan [de-identified] : STI counseling

## 2022-01-24 NOTE — ASSESSMENT
[FreeTextEntry1] : Mr. LARSEN is a 60 year  male, who present to the office for  fasting lab and a blood pressure check

## 2022-01-24 NOTE — PHYSICAL EXAM
[Well Nourished] : well nourished [Well Developed] : well developed [Well-Appearing] : well-appearing [Normal Sclera/Conjunctiva] : normal sclera/conjunctiva [PERRL] : pupils equal round and reactive to light [EOMI] : extraocular movements intact [Normal Outer Ear/Nose] : the outer ears and nose were normal in appearance [Normal Oropharynx] : the oropharynx was normal [Normal Nasal Mucosa] : the nasal mucosa was normal [No JVD] : no jugular venous distention [No Lymphadenopathy] : no lymphadenopathy [Supple] : supple [Thyroid Normal, No Nodules] : the thyroid was normal and there were no nodules present [No Respiratory Distress] : no respiratory distress  [No Accessory Muscle Use] : no accessory muscle use [Clear to Auscultation] : lungs were clear to auscultation bilaterally [Normal Rate] : normal rate  [Regular Rhythm] : with a regular rhythm [Normal S1, S2] : normal S1 and S2 [No Murmur] : no murmur heard [No Carotid Bruits] : no carotid bruits [No Abdominal Bruit] : a ~M bruit was not heard ~T in the abdomen [No Varicosities] : no varicosities [Pedal Pulses Present] : the pedal pulses are present [No Edema] : there was no peripheral edema [No Palpable Aorta] : no palpable aorta [No Extremity Clubbing/Cyanosis] : no extremity clubbing/cyanosis [Soft] : abdomen soft [Non Tender] : non-tender [Non-distended] : non-distended [No Masses] : no abdominal mass palpated [No HSM] : no HSM [Normal Bowel Sounds] : normal bowel sounds [Obese] : obese [Normal Sphincter Tone] : normal sphincter tone [Normal Posterior Cervical Nodes] : no posterior cervical lymphadenopathy [Normal Anterior Cervical Nodes] : no anterior cervical lymphadenopathy [No CVA Tenderness] : no CVA  tenderness [No Spinal Tenderness] : no spinal tenderness [No Joint Swelling] : no joint swelling [Grossly Normal Strength/Tone] : grossly normal strength/tone [No Rash] : no rash [Coordination Grossly Intact] : coordination grossly intact [No Focal Deficits] : no focal deficits [Normal Gait] : normal gait [Deep Tendon Reflexes (DTR)] : deep tendon reflexes were 2+ and symmetric [Speech Grossly Normal] : speech grossly normal [Normal Affect] : the affect was normal [Alert and Oriented x3] : oriented to person, place, and time [Normal Mood] : the mood was normal [Normal Insight/Judgement] : insight and judgment were intact [No Acute Distress] : no acute distress [Normal TMs] : both tympanic membranes were normal [de-identified] : small abrasion left scalp area

## 2022-01-24 NOTE — REVIEW OF SYSTEMS
[Fever] : no fever [Recent Change In Weight] : ~T no recent weight change [Vision Problems] : no vision problems [Earache] : no earache [Hearing Loss] : no hearing loss [Nasal Discharge] : no nasal discharge [Sore Throat] : no sore throat [Chest Pain] : no chest pain [Palpitations] : no palpitations [Lower Ext Edema] : no lower extremity edema [Paroxysmal Nocturnal Dyspnea] : no paroxysmal nocturnal dyspnea [Shortness Of Breath] : no shortness of breath [Wheezing] : no wheezing [Cough] : no cough [Dyspnea on Exertion] : not dyspnea on exertion [Abdominal Pain] : no abdominal pain [Vomiting] : no vomiting [Melena] : no melena [Dysuria] : no dysuria [Joint Pain] : no joint pain [Muscle Weakness] : no muscle weakness [Back Pain] : no back pain [Itching] : no itching [Hair Changes] : no hair changes [Skin Rash] : no skin rash [Headache] : no headache [Fainting] : no fainting [Memory Loss] : no memory loss [Swollen Glands] : no swollen glands [Negative] : Integumentary

## 2022-01-24 NOTE — HISTORY OF PRESENT ILLNESS
[FreeTextEntry1] : Fasting labs  [de-identified] : Mr. LARSEN is a 61 year  male, who present to the office for Fasting labs and a general check up.   Recently got refill on medication \par General feels well.\par Denies getting the covid booster

## 2022-01-24 NOTE — PLAN
[FreeTextEntry1] : Cardio   -hyperlipidemia, obesity -  Patient was advised the importance of participating in aerobic exercise programs improve their stamina.  GILBERT was encourage to start an exercise program.     \par  Advised weight loss. Refilled medication-  Check labs \par \par Dermatology GILBERT was encouraged to wear sun protective clothing, sun block, and proper use of SPF board brim hats, to seek shade and to avoid the sun in peak hours.  ABCD of skin moles was advised. \par Acne rosacea - follow up with derm for a full body exam - trial metrogel \par \par Psych - OCTAVIO- Continue with  xanax.  Counseling given  to the pt - reviewed -\par advised abuse potential \par \par Endo - Hypothyroidism -  Counseling given to the pt.  Continue with current medication -\par discussed  generic vs branded medication\par hyperglycemia- Advised to continue with better eating habits.  Check 1c \par \par Endocrinology  -  Obesity  BMI 38  Patient was educated about the importance of diet and exercise.   We discussed  a goal of a BMI near 25.   Patient was advised different treatment modalities.\par discussed short term goal for weight loss.  Advised to get a BMI 25 weight should be under 145 \par \par Patient  education  - COVID-19   Counseling and education provided to the patient.  Advised sign and symptoms of the virus.  Advised contact precautions.  Educated patient on proper hand washing and to participate in social distancing. discussed booster shots - Advised booster shot \par Patient is in full awareness of the plan and agrees to it.  All pt question was answered.  \par \par \par

## 2022-01-25 ENCOUNTER — NON-APPOINTMENT (OUTPATIENT)
Age: 62
End: 2022-01-25

## 2022-01-25 LAB
25(OH)D3 SERPL-MCNC: 42.3 NG/ML
ALBUMIN SERPL ELPH-MCNC: 4.4 G/DL
ALP BLD-CCNC: 74 U/L
ALT SERPL-CCNC: 14 U/L
ANION GAP SERPL CALC-SCNC: 9 MMOL/L
AST SERPL-CCNC: 15 U/L
BASOPHILS # BLD AUTO: 0.05 K/UL
BASOPHILS NFR BLD AUTO: 0.8 %
BILIRUB SERPL-MCNC: 0.4 MG/DL
BUN SERPL-MCNC: 16 MG/DL
CALCIUM SERPL-MCNC: 9.4 MG/DL
CHLORIDE SERPL-SCNC: 104 MMOL/L
CHOLEST SERPL-MCNC: 149 MG/DL
CO2 SERPL-SCNC: 29 MMOL/L
CREAT SERPL-MCNC: 0.89 MG/DL
EOSINOPHIL # BLD AUTO: 0.17 K/UL
EOSINOPHIL NFR BLD AUTO: 2.8 %
GLUCOSE SERPL-MCNC: 102 MG/DL
HCT VFR BLD CALC: 47.5 %
HDLC SERPL-MCNC: 54 MG/DL
HGB BLD-MCNC: 15.2 G/DL
IMM GRANULOCYTES NFR BLD AUTO: 0.3 %
LDLC SERPL CALC-MCNC: 84 MG/DL
LYMPHOCYTES # BLD AUTO: 1.84 K/UL
LYMPHOCYTES NFR BLD AUTO: 30.6 %
MAN DIFF?: NORMAL
MCHC RBC-ENTMCNC: 29.9 PG
MCHC RBC-ENTMCNC: 32 GM/DL
MCV RBC AUTO: 93.3 FL
MONOCYTES # BLD AUTO: 0.57 K/UL
MONOCYTES NFR BLD AUTO: 9.5 %
NEUTROPHILS # BLD AUTO: 3.37 K/UL
NEUTROPHILS NFR BLD AUTO: 56 %
NONHDLC SERPL-MCNC: 95 MG/DL
PLATELET # BLD AUTO: 290 K/UL
POTASSIUM SERPL-SCNC: 4.5 MMOL/L
PROT SERPL-MCNC: 7 G/DL
RBC # BLD: 5.09 M/UL
RBC # FLD: 13.8 %
SODIUM SERPL-SCNC: 141 MMOL/L
TRIGL SERPL-MCNC: 57 MG/DL
TSH SERPL-ACNC: 1.24 UIU/ML
WBC # FLD AUTO: 6.02 K/UL

## 2022-02-02 ENCOUNTER — RX RENEWAL (OUTPATIENT)
Age: 62
End: 2022-02-02

## 2022-02-15 ENCOUNTER — RX RENEWAL (OUTPATIENT)
Age: 62
End: 2022-02-15

## 2022-03-16 ENCOUNTER — RX RENEWAL (OUTPATIENT)
Age: 62
End: 2022-03-16

## 2022-03-29 ENCOUNTER — APPOINTMENT (OUTPATIENT)
Dept: UROLOGY | Facility: CLINIC | Age: 62
End: 2022-03-29
Payer: COMMERCIAL

## 2022-03-29 VITALS
TEMPERATURE: 98 F | WEIGHT: 218 LBS | SYSTOLIC BLOOD PRESSURE: 120 MMHG | DIASTOLIC BLOOD PRESSURE: 69 MMHG | BODY MASS INDEX: 38.62 KG/M2 | HEIGHT: 63 IN

## 2022-03-29 VITALS — HEART RATE: 58 BPM

## 2022-03-29 DIAGNOSIS — N52.9 MALE ERECTILE DYSFUNCTION, UNSPECIFIED: ICD-10-CM

## 2022-03-29 PROCEDURE — 99203 OFFICE O/P NEW LOW 30 MIN: CPT

## 2022-03-29 NOTE — PHYSICAL EXAM
[Urethral Meatus] : meatus normal [Penis Abnormality] : normal circumcised penis [Scrotum] : the scrotum was normal [Normal Appearance] : normal appearance [General Appearance - In No Acute Distress] : no acute distress [] : no respiratory distress [Abdomen Soft] : soft [Abdomen Tenderness] : non-tender [FreeTextEntry1] : Right tsstis larger than left  [Normal Station and Gait] : the gait and station were normal for the patient's age [Skin Color & Pigmentation] : normal skin color and pigmentation [No Focal Deficits] : no focal deficits [Oriented To Time, Place, And Person] : oriented to person, place, and time

## 2022-03-29 NOTE — HISTORY OF PRESENT ILLNESS
[FreeTextEntry1] : 61 year old male presents for Erectile dysfunction. \par Has been having weaker erections for last 1 year. \par On Paroxetine, had prolonged ejaculation. \par Rates erections 2/5, Has problem with attaining and maintaining erections. \par With Sildenafil after 1 hour 3/5 response. Also has tried Levitra: same response.\par Denies any difficulty with urination. Nocturia \par Denies dysuria, hematuria, lower abdominal or flank pain, nausea, vomiting, fever, chills or rigors. \par No family history of Prostate cancer. \par \par Past smoker. 1 PPD for 10 years. Quit 1985.

## 2022-03-29 NOTE — ASSESSMENT
[FreeTextEntry1] : Erectile dysfunction:\par Reviewed pathophysiology and differential diagnosis of erectile dysfunction with the patient. Discussed lifestyle changes. \par The patient was made aware that the current therapies for erectile dysfunction consisting of:\par Oral phosphodiesterase type 5 inhibitors like Viagra/Sildenafil and Cialis/Tadalafil. \par Vacuum erection devices. \par Intraurethral suppository. \par Intracavernous vasoactive drug injection.\par Penile prosthesis implantation.\par Risks and benefits of each were discussed. All questions were answered.\par Wants to try Cialis/Tadalafil. Discussed proper use. \par He was advised to take the medication 30-60 minutes prior to sexual activity and that the efficacy of the medication is decreased following a high-fat meal or concurrent use of alcohol. \par Explained the common adverse effects of therapy including, but not limited to headache, flushing, upset stomach, nasal congestion, abnormal vision, back pain, and myalgia. Asked pt to stop taking the medicine if he develops chest pain, visual or auditory disturbance. \par Explained priapism- if erection does not go down after ejaculation or lasts more than 4 hrs to present to emergency room. \par \par Will get Total and Free Testosterone \par Will inform results. \par \par Return to office in 3 months or sooner if any issues. \par

## 2022-03-31 ENCOUNTER — RX RENEWAL (OUTPATIENT)
Age: 62
End: 2022-03-31

## 2022-04-11 ENCOUNTER — RX RENEWAL (OUTPATIENT)
Age: 62
End: 2022-04-11

## 2022-05-06 ENCOUNTER — APPOINTMENT (OUTPATIENT)
Dept: INTERNAL MEDICINE | Facility: CLINIC | Age: 62
End: 2022-05-06
Payer: COMMERCIAL

## 2022-05-06 VITALS
TEMPERATURE: 97.2 F | WEIGHT: 220 LBS | BODY MASS INDEX: 38.98 KG/M2 | DIASTOLIC BLOOD PRESSURE: 70 MMHG | OXYGEN SATURATION: 98 % | RESPIRATION RATE: 16 BRPM | HEART RATE: 65 BPM | HEIGHT: 63 IN | SYSTOLIC BLOOD PRESSURE: 118 MMHG

## 2022-05-06 LAB
BILIRUB UR QL STRIP: NORMAL
CLARITY UR: CLEAR
COLLECTION METHOD: NORMAL
GLUCOSE UR-MCNC: NORMAL
HCG UR QL: 0.2 EU/DL
HGB UR QL STRIP.AUTO: NORMAL
KETONES UR-MCNC: NORMAL
LEUKOCYTE ESTERASE UR QL STRIP: NORMAL
NITRITE UR QL STRIP: NORMAL
PH UR STRIP: 6
PROT UR STRIP-MCNC: NORMAL
SP GR UR STRIP: 1.01

## 2022-05-06 PROCEDURE — 99214 OFFICE O/P EST MOD 30 MIN: CPT | Mod: 25

## 2022-05-06 PROCEDURE — 36415 COLL VENOUS BLD VENIPUNCTURE: CPT

## 2022-05-06 PROCEDURE — 81003 URINALYSIS AUTO W/O SCOPE: CPT | Mod: QW

## 2022-05-06 RX ORDER — ALPRAZOLAM 0.25 MG/1
0.25 TABLET ORAL
Qty: 90 | Refills: 0 | Status: ACTIVE | COMMUNITY
Start: 2020-02-11 | End: 1900-01-01

## 2022-05-06 RX ORDER — METRONIDAZOLE 7.5 MG/G
0.75 CREAM TOPICAL
Qty: 1 | Refills: 6 | Status: DISCONTINUED | COMMUNITY
Start: 2021-10-20 | End: 2022-05-06

## 2022-05-06 RX ORDER — TADALAFIL 20 MG/1
20 TABLET ORAL
Qty: 45 | Refills: 1 | Status: DISCONTINUED | COMMUNITY
Start: 2022-03-29 | End: 2022-05-06

## 2022-05-06 RX ORDER — KETOCONAZOLE 20.5 MG/ML
2 SHAMPOO, SUSPENSION TOPICAL
Qty: 1 | Refills: 3 | Status: DISCONTINUED | COMMUNITY
Start: 2022-01-20 | End: 2022-05-06

## 2022-05-06 RX ORDER — TACROLIMUS 1 MG/G
0.1 OINTMENT TOPICAL
Qty: 1 | Refills: 0 | Status: DISCONTINUED | COMMUNITY
Start: 2022-01-20 | End: 2022-05-06

## 2022-05-06 NOTE — COUNSELING
[Behavioral health counseling provided] : Behavioral health counseling provided [Benefits of weight loss discussed] : Benefits of weight loss discussed [Encouraged to maintain food diary] : Encouraged to maintain food diary [Encouraged to increase physical activity] : Encouraged to increase physical activity [Decrease Portions] : decrease portions [Good understanding] : Patient has a good understanding of disease, goals and obesity follow-up plan [FreeTextEntry2] : 1800 anay diet  [de-identified] : [facility] counseling

## 2022-05-06 NOTE — REVIEW OF SYSTEMS
[Negative] : Heme/Lymph [Fever] : no fever [Recent Change In Weight] : ~T recent weight change [Vision Problems] : no vision problems [Earache] : no earache [Hearing Loss] : no hearing loss [Nasal Discharge] : no nasal discharge [Sore Throat] : no sore throat [Chest Pain] : no chest pain [Palpitations] : no palpitations [Lower Ext Edema] : no lower extremity edema [Paroxysmal Nocturnal Dyspnea] : no paroxysmal nocturnal dyspnea [Shortness Of Breath] : no shortness of breath [Wheezing] : no wheezing [Cough] : no cough [Dyspnea on Exertion] : not dyspnea on exertion [Abdominal Pain] : no abdominal pain [Vomiting] : no vomiting [Melena] : no melena [Dysuria] : no dysuria [Joint Pain] : no joint pain [Muscle Weakness] : no muscle weakness [Back Pain] : no back pain [Itching] : no itching [Hair Changes] : no hair changes [Skin Rash] : no skin rash [Headache] : no headache [Fainting] : no fainting [Memory Loss] : no memory loss [Swollen Glands] : no swollen glands [FreeTextEntry2] : some weight loss planed

## 2022-05-06 NOTE — COUNSELING
[Behavioral health counseling provided] : Behavioral health counseling provided [Benefits of weight loss discussed] : Benefits of weight loss discussed [Encouraged to maintain food diary] : Encouraged to maintain food diary [Encouraged to increase physical activity] : Encouraged to increase physical activity [Decrease Portions] : decrease portions [Good understanding] : Patient has a good understanding of disease, goals and obesity follow-up plan [FreeTextEntry2] : 1800 anay diet  [de-identified] : [facility] counseling

## 2022-05-06 NOTE — HISTORY OF PRESENT ILLNESS
[FreeTextEntry1] : patient would like to follow up on medication refills on Levothyroxine, Xanax,Rosuvastatin and Paroxetine. [de-identified] : Mr. LARSEN is a 61 year  male, who present to the office for medication renewal and fasting labs.  Requesing a renewal on Xanax for anxiety.  States takes  for anxiety and panic attacks which works well. Denies any side effects.\par Followed up with urology and was given a rx for Vardenafil.\par

## 2022-05-06 NOTE — PHYSICAL EXAM
[No Acute Distress] : no acute distress [Well Nourished] : well nourished [Well Developed] : well developed [Well-Appearing] : well-appearing [Normal Sclera/Conjunctiva] : normal sclera/conjunctiva [PERRL] : pupils equal round and reactive to light [EOMI] : extraocular movements intact [Normal Outer Ear/Nose] : the outer ears and nose were normal in appearance [Normal Oropharynx] : the oropharynx was normal [Normal TMs] : both tympanic membranes were normal [Normal Nasal Mucosa] : the nasal mucosa was normal [No JVD] : no jugular venous distention [No Lymphadenopathy] : no lymphadenopathy [Supple] : supple [Thyroid Normal, No Nodules] : the thyroid was normal and there were no nodules present [No Respiratory Distress] : no respiratory distress  [No Accessory Muscle Use] : no accessory muscle use [Clear to Auscultation] : lungs were clear to auscultation bilaterally [Normal Rate] : normal rate  [Regular Rhythm] : with a regular rhythm [Normal S1, S2] : normal S1 and S2 [No Murmur] : no murmur heard [No Carotid Bruits] : no carotid bruits [No Abdominal Bruit] : a ~M bruit was not heard ~T in the abdomen [No Varicosities] : no varicosities [Pedal Pulses Present] : the pedal pulses are present [No Edema] : there was no peripheral edema [No Palpable Aorta] : no palpable aorta [No Extremity Clubbing/Cyanosis] : no extremity clubbing/cyanosis [Soft] : abdomen soft [Non Tender] : non-tender [Non-distended] : non-distended [No Masses] : no abdominal mass palpated [No HSM] : no HSM [Normal Bowel Sounds] : normal bowel sounds [Obese] : obese [Normal Sphincter Tone] : normal sphincter tone [Normal Posterior Cervical Nodes] : no posterior cervical lymphadenopathy [Normal Anterior Cervical Nodes] : no anterior cervical lymphadenopathy [No CVA Tenderness] : no CVA  tenderness [No Spinal Tenderness] : no spinal tenderness [No Joint Swelling] : no joint swelling [Grossly Normal Strength/Tone] : grossly normal strength/tone [No Rash] : no rash [Coordination Grossly Intact] : coordination grossly intact [No Focal Deficits] : no focal deficits [Normal Gait] : normal gait [Deep Tendon Reflexes (DTR)] : deep tendon reflexes were 2+ and symmetric [Speech Grossly Normal] : speech grossly normal [Normal Affect] : the affect was normal [Alert and Oriented x3] : oriented to person, place, and time [Normal Mood] : the mood was normal [Normal Insight/Judgement] : insight and judgment were intact [de-identified] : small abrasion left scalp area

## 2022-05-06 NOTE — HISTORY OF PRESENT ILLNESS
[FreeTextEntry1] : patient would like to follow up on medication refills on Levothyroxine, Xanax,Rosuvastatin and Paroxetine. [de-identified] : Mr. LARSEN is a 61 year  male, who present to the office for medication renewal and fasting labs.  Requesing a renewal on Xanax for anxiety.  States takes  for anxiety and panic attacks which works well. Denies any side effects.\par Followed up with urology and was given a rx for Vardenafil.\par

## 2022-05-06 NOTE — PHYSICAL EXAM
[No Acute Distress] : no acute distress [Well Nourished] : well nourished [Well Developed] : well developed [Well-Appearing] : well-appearing [Normal Sclera/Conjunctiva] : normal sclera/conjunctiva [PERRL] : pupils equal round and reactive to light [EOMI] : extraocular movements intact [Normal Outer Ear/Nose] : the outer ears and nose were normal in appearance [Normal Oropharynx] : the oropharynx was normal [Normal TMs] : both tympanic membranes were normal [Normal Nasal Mucosa] : the nasal mucosa was normal [No JVD] : no jugular venous distention [No Lymphadenopathy] : no lymphadenopathy [Supple] : supple [Thyroid Normal, No Nodules] : the thyroid was normal and there were no nodules present [No Respiratory Distress] : no respiratory distress  [No Accessory Muscle Use] : no accessory muscle use [Clear to Auscultation] : lungs were clear to auscultation bilaterally [Normal Rate] : normal rate  [Regular Rhythm] : with a regular rhythm [Normal S1, S2] : normal S1 and S2 [No Murmur] : no murmur heard [No Carotid Bruits] : no carotid bruits [No Abdominal Bruit] : a ~M bruit was not heard ~T in the abdomen [No Varicosities] : no varicosities [Pedal Pulses Present] : the pedal pulses are present [No Edema] : there was no peripheral edema [No Palpable Aorta] : no palpable aorta [No Extremity Clubbing/Cyanosis] : no extremity clubbing/cyanosis [Soft] : abdomen soft [Non Tender] : non-tender [Non-distended] : non-distended [No Masses] : no abdominal mass palpated [No HSM] : no HSM [Normal Bowel Sounds] : normal bowel sounds [Obese] : obese [Normal Sphincter Tone] : normal sphincter tone [Normal Posterior Cervical Nodes] : no posterior cervical lymphadenopathy [Normal Anterior Cervical Nodes] : no anterior cervical lymphadenopathy [No CVA Tenderness] : no CVA  tenderness [No Spinal Tenderness] : no spinal tenderness [No Joint Swelling] : no joint swelling [Grossly Normal Strength/Tone] : grossly normal strength/tone [No Rash] : no rash [Coordination Grossly Intact] : coordination grossly intact [No Focal Deficits] : no focal deficits [Normal Gait] : normal gait [Deep Tendon Reflexes (DTR)] : deep tendon reflexes were 2+ and symmetric [Speech Grossly Normal] : speech grossly normal [Normal Affect] : the affect was normal [Alert and Oriented x3] : oriented to person, place, and time [Normal Mood] : the mood was normal [Normal Insight/Judgement] : insight and judgment were intact [de-identified] : small abrasion left scalp area

## 2022-05-06 NOTE — ASSESSMENT
[FreeTextEntry1] : Mr. LARSEN is a 61  year  male, who present to the office for  fasting lab, renewal of medications  and a blood pressure check

## 2022-05-06 NOTE — PLAN
[FreeTextEntry1] : Cardio   -hyperlipidemia, obesity -  Patient was advised the importance of participating in aerobic exercise programs improve their stamina.  GILBERT was encourage to start an exercise program.     \par Continue with weight loss. Refilled medication-  Check labs FLP/LFT \par \par Psych - OCTAVIO- Continue with  xanax.  Counseling given  to the pt - reviewed -\par advised abuse potential. Advised not to operate heavy machinery when taking the medication \par \par Endo - Hypothyroidism -  Counseling given to the pt.  Continue with current medication -\par discussed. Refilled levothyroxine - Check labs TSH \par \par hyperglycemia- Advised to continue with better eating habits.  Check 1c \par \par Endocrinology  -  Obesity  BMI 38  Patient was educated about the importance of diet and exercise.   We discussed  a goal of a BMI near 25.   Patient was advised different treatment modalities.\par discussed short term goal for weight loss.  Advised to get a BMI 25 weight should be under 145 \par \par Patient  education  - COVID-19   Counseling and education provided to the patient.  Advised sign and symptoms of the virus.  Advised contact precautions.  Educated patient on proper hand washing and to participate in social distancing. discussed booster shots - Advised booster shot \par Patient is in full awareness of the plan and agrees to it.  All pt question was answered.  \par \par I spent 30  Minutes with the patient, half of which we discussed finding on physical exam  and coordinated care.  As well as reviewed my plans and follow ups.

## 2022-05-06 NOTE — HEALTH RISK ASSESSMENT
[2 - 3 times a week (3 pts)] : 2 - 3  times a week (3 points) [1 or 2 (0 pts)] : 1 or 2 (0 points) [Never (0 pts)] : Never (0 points) [No] : In the past 12 months have you used drugs other than those required for medical reasons? 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

## 2022-05-13 ENCOUNTER — NON-APPOINTMENT (OUTPATIENT)
Age: 62
End: 2022-05-13

## 2022-05-18 LAB
25(OH)D3 SERPL-MCNC: 38.7 NG/ML
ALBUMIN SERPL ELPH-MCNC: 4.6 G/DL
ALP BLD-CCNC: 76 U/L
ALT SERPL-CCNC: 16 U/L
ANION GAP SERPL CALC-SCNC: 14 MMOL/L
AST SERPL-CCNC: 17 U/L
BASOPHILS # BLD AUTO: 0.05 K/UL
BASOPHILS NFR BLD AUTO: 1 %
BILIRUB SERPL-MCNC: 0.4 MG/DL
BUN SERPL-MCNC: 11 MG/DL
CALCIUM SERPL-MCNC: 9.5 MG/DL
CHLORIDE SERPL-SCNC: 102 MMOL/L
CHOLEST SERPL-MCNC: 144 MG/DL
CO2 SERPL-SCNC: 25 MMOL/L
CREAT SERPL-MCNC: 0.93 MG/DL
EGFR: 93 ML/MIN/1.73M2
EOSINOPHIL # BLD AUTO: 0.13 K/UL
EOSINOPHIL NFR BLD AUTO: 2.6 %
ESTIMATED AVERAGE GLUCOSE: 111 MG/DL
GLUCOSE SERPL-MCNC: 101 MG/DL
HBA1C MFR BLD HPLC: 5.5 %
HCT VFR BLD CALC: 47.5 %
HDLC SERPL-MCNC: 51 MG/DL
HGB BLD-MCNC: 15 G/DL
IMM GRANULOCYTES NFR BLD AUTO: 0.4 %
LDLC SERPL CALC-MCNC: 74 MG/DL
LYMPHOCYTES # BLD AUTO: 1.84 K/UL
LYMPHOCYTES NFR BLD AUTO: 36.4 %
MAN DIFF?: NORMAL
MCHC RBC-ENTMCNC: 29.8 PG
MCHC RBC-ENTMCNC: 31.6 GM/DL
MCV RBC AUTO: 94.4 FL
MONOCYTES # BLD AUTO: 0.55 K/UL
MONOCYTES NFR BLD AUTO: 10.9 %
NEUTROPHILS # BLD AUTO: 2.47 K/UL
NEUTROPHILS NFR BLD AUTO: 48.7 %
NONHDLC SERPL-MCNC: 93 MG/DL
PLATELET # BLD AUTO: 311 K/UL
POTASSIUM SERPL-SCNC: 4.1 MMOL/L
PROT SERPL-MCNC: 7.1 G/DL
RBC # BLD: 5.03 M/UL
RBC # FLD: 13.7 %
SODIUM SERPL-SCNC: 140 MMOL/L
TRIGL SERPL-MCNC: 92 MG/DL
TSH SERPL-ACNC: 0.89 UIU/ML
WBC # FLD AUTO: 5.06 K/UL

## 2022-06-06 ENCOUNTER — RX RENEWAL (OUTPATIENT)
Age: 62
End: 2022-06-06

## 2022-07-12 ENCOUNTER — NON-APPOINTMENT (OUTPATIENT)
Age: 62
End: 2022-07-12

## 2022-07-26 ENCOUNTER — APPOINTMENT (OUTPATIENT)
Dept: UROLOGY | Facility: CLINIC | Age: 62
End: 2022-07-26

## 2022-08-08 ENCOUNTER — RX RENEWAL (OUTPATIENT)
Age: 62
End: 2022-08-08

## 2022-08-29 ENCOUNTER — APPOINTMENT (OUTPATIENT)
Dept: INTERNAL MEDICINE | Facility: CLINIC | Age: 62
End: 2022-08-29

## 2022-08-29 VITALS
DIASTOLIC BLOOD PRESSURE: 74 MMHG | OXYGEN SATURATION: 96 % | BODY MASS INDEX: 38.28 KG/M2 | SYSTOLIC BLOOD PRESSURE: 124 MMHG | TEMPERATURE: 97.5 F | RESPIRATION RATE: 16 BRPM | HEART RATE: 67 BPM | WEIGHT: 216.06 LBS | HEIGHT: 63 IN

## 2022-08-29 DIAGNOSIS — B35.6 TINEA CRURIS: ICD-10-CM

## 2022-08-29 PROCEDURE — 99214 OFFICE O/P EST MOD 30 MIN: CPT | Mod: 25

## 2022-08-29 PROCEDURE — 36415 COLL VENOUS BLD VENIPUNCTURE: CPT

## 2022-08-29 RX ORDER — IPRATROPIUM BROMIDE 42 UG/1
0.06 SPRAY NASAL
Qty: 4 | Refills: 3 | Status: DISCONTINUED | COMMUNITY
Start: 2021-08-31 | End: 2022-08-29

## 2022-08-29 NOTE — ADDENDUM
[FreeTextEntry1] : I, Blaze Montez, acted solely as scribe for Dr. Gaston Bui DO on this date 08/29/2022 10:40AM .\par \par All medical record entries made by the Scribe were at my, Dr. Gaston Bui DO direction and personally dictated by me on 08/29/2022 10:40AM. I have reviewed the chart and agree that the record accurately reflects my personal performance of the history, physical exam, assessment and plan. I have also personally directed, reviewed and agreed with the chart.

## 2022-08-29 NOTE — PLAN
[FreeTextEntry1] : Cardiology\par hypercholesterolemia - continue Rosuvastatin Calcium 5mg p.o.q.d. as directed, Rx filled - continue low cholesterol/low fat diet - check FLP and LFTs\par Endocrinology\par hypothyroidism - continue Levothyroxine Sodium 75mcg p.o.q.d. as directed, Rx filled - check TSH\par hyperglycemia - continue low carbohydrate diet; previously recommended increasing CV exercise - check hemoglobin A1C\par history of obesity - continue low carbohydrate diet; previously recommended increasing CV exercise\par Recommended taking Vitamin D-3 2000 IU daily with a meal - check Vitamin D \par Dermatology\par tinea cruris - discontinue Hydrocortisone 2.5% External Ointment; start Clotrimazole-Betamethasone 1-0.05% External Cream, Rx filled: apply thin film to affected areas BID q.d. as directed; recommended keeping the area clean/dry\par Psychiatry\par chronic depression/anxiety - continue Paroxetine HCl 30mg p.o.q.d. as directed, Rx filled; continue Alprazolam 0.25mg p.o. p.r.n. as directed for breakthrough anxiety\par Urology\par ED - continue Vardenafil HCl 10mg p.o. p.r.n. as directed\par \par check CBC, CMP, FLP, hemoglobin A1C, TSH, and Vitamin D

## 2022-08-29 NOTE — HEALTH RISK ASSESSMENT
[Former] : Former [Yes] : Yes [2 - 3 times a week (3 pts)] : 2 - 3  times a week (3 points) [1 or 2 (0 pts)] : 1 or 2 (0 points) [Never (0 pts)] : Never (0 points) [No] : In the past 12 months have you used drugs other than those required for medical reasons? 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 [de-identified] : [employer]. [[id number]] : 0

## 2022-08-29 NOTE — PHYSICAL EXAM
[No Acute Distress] : no acute distress [Well Nourished] : well nourished [Well Developed] : well developed [Well-Appearing] : well-appearing [Normal Sclera/Conjunctiva] : normal sclera/conjunctiva [PERRL] : pupils equal round and reactive to light [EOMI] : extraocular movements intact [Normal Outer Ear/Nose] : the outer ears and nose were normal in appearance [Normal Oropharynx] : the oropharynx was normal [No JVD] : no jugular venous distention [No Lymphadenopathy] : no lymphadenopathy [Supple] : supple [Thyroid Normal, No Nodules] : the thyroid was normal and there were no nodules present [No Respiratory Distress] : no respiratory distress  [No Accessory Muscle Use] : no accessory muscle use [Clear to Auscultation] : lungs were clear to auscultation bilaterally [Normal Rate] : normal rate  [Regular Rhythm] : with a regular rhythm [Normal S1, S2] : normal S1 and S2 [No Murmur] : no murmur heard [No Carotid Bruits] : no carotid bruits [No Abdominal Bruit] : a ~M bruit was not heard ~T in the abdomen [No Varicosities] : no varicosities [Pedal Pulses Present] : the pedal pulses are present [No Edema] : there was no peripheral edema [No Palpable Aorta] : no palpable aorta [No Extremity Clubbing/Cyanosis] : no extremity clubbing/cyanosis [Soft] : abdomen soft [Non Tender] : non-tender [Non-distended] : non-distended [No Masses] : no abdominal mass palpated [No HSM] : no HSM [Normal Bowel Sounds] : normal bowel sounds [Normal Posterior Cervical Nodes] : no posterior cervical lymphadenopathy [Normal Anterior Cervical Nodes] : no anterior cervical lymphadenopathy [No CVA Tenderness] : no CVA  tenderness [No Spinal Tenderness] : no spinal tenderness [No Joint Swelling] : no joint swelling [Grossly Normal Strength/Tone] : grossly normal strength/tone [No Rash] : no rash [Coordination Grossly Intact] : coordination grossly intact [No Focal Deficits] : no focal deficits [Normal Gait] : normal gait [Deep Tendon Reflexes (DTR)] : deep tendon reflexes were 2+ and symmetric [Normal Affect] : the affect was normal [Normal Insight/Judgement] : insight and judgment were intact [Normal Voice/Communication] : normal voice/communication [Normal TMs] : both tympanic membranes were normal [Normal Nasal Mucosa] : the nasal mucosa was normal [Normal Supraclavicular Nodes] : no supraclavicular lymphadenopathy [Kyphosis] : no kyphosis [Scoliosis] : no scoliosis [Speech Grossly Normal] : speech grossly normal [Memory Grossly Normal] : memory grossly normal [Alert and Oriented x3] : oriented to person, place, and time [Normal Mood] : the mood was normal [de-identified] : obese

## 2022-08-29 NOTE — HISTORY OF PRESENT ILLNESS
[FreeTextEntry1] : fasting blood work, Rx refill, and general follow-up [de-identified] : Patient is a 61 year old male with a past medical history as below who presents for fasting blood work, Rx refill, and general follow-up. Patient states he is taking all medications as prescribed and denies any adverse reactions or side effects. He denies any new arthralgias or myalgias on Rosuvastatin Calcium. He denies heat or cold intolerance on Levothyroxine Sodium. Patient has not been taking Vitamin D-3 with a meal. Patient intermittently notes jock itch. Dermatology had prescribed Hydrocortisone 2.5% External Ointment in the past. Patient requests Rx refill for Rosuvastatin Calcium, Levothyroxine Sodium, and Paroxetine.

## 2022-08-29 NOTE — ASSESSMENT
[FreeTextEntry1] : Patient is a 61 year old male with a past medical history as above who presents for fasting blood work, Rx refill, and general follow-up.

## 2022-08-30 LAB
25(OH)D3 SERPL-MCNC: 49.9 NG/ML
ALBUMIN SERPL ELPH-MCNC: 4.8 G/DL
ALP BLD-CCNC: 67 U/L
ALT SERPL-CCNC: 17 U/L
ANION GAP SERPL CALC-SCNC: 11 MMOL/L
AST SERPL-CCNC: 16 U/L
BASOPHILS # BLD AUTO: 0.03 K/UL
BASOPHILS NFR BLD AUTO: 0.5 %
BILIRUB SERPL-MCNC: 0.5 MG/DL
BUN SERPL-MCNC: 14 MG/DL
CALCIUM SERPL-MCNC: 9.7 MG/DL
CHLORIDE SERPL-SCNC: 102 MMOL/L
CHOLEST SERPL-MCNC: 161 MG/DL
CO2 SERPL-SCNC: 29 MMOL/L
CREAT SERPL-MCNC: 0.95 MG/DL
EGFR: 90 ML/MIN/1.73M2
EOSINOPHIL # BLD AUTO: 0.19 K/UL
EOSINOPHIL NFR BLD AUTO: 3.1 %
ESTIMATED AVERAGE GLUCOSE: 108 MG/DL
GLUCOSE SERPL-MCNC: 98 MG/DL
HBA1C MFR BLD HPLC: 5.4 %
HCT VFR BLD CALC: 49.4 %
HDLC SERPL-MCNC: 60 MG/DL
HGB BLD-MCNC: 16.1 G/DL
IMM GRANULOCYTES NFR BLD AUTO: 0.3 %
LDLC SERPL CALC-MCNC: 82 MG/DL
LYMPHOCYTES # BLD AUTO: 1.99 K/UL
LYMPHOCYTES NFR BLD AUTO: 32.6 %
MAN DIFF?: NORMAL
MCHC RBC-ENTMCNC: 30.4 PG
MCHC RBC-ENTMCNC: 32.6 GM/DL
MCV RBC AUTO: 93.4 FL
MONOCYTES # BLD AUTO: 0.66 K/UL
MONOCYTES NFR BLD AUTO: 10.8 %
NEUTROPHILS # BLD AUTO: 3.21 K/UL
NEUTROPHILS NFR BLD AUTO: 52.7 %
NONHDLC SERPL-MCNC: 101 MG/DL
PLATELET # BLD AUTO: 284 K/UL
POTASSIUM SERPL-SCNC: 4.7 MMOL/L
PROT SERPL-MCNC: 7.1 G/DL
RBC # BLD: 5.29 M/UL
RBC # FLD: 14.2 %
SODIUM SERPL-SCNC: 142 MMOL/L
TRIGL SERPL-MCNC: 99 MG/DL
TSH SERPL-ACNC: 1.35 UIU/ML
WBC # FLD AUTO: 6.1 K/UL

## 2022-08-31 ENCOUNTER — NON-APPOINTMENT (OUTPATIENT)
Age: 62
End: 2022-08-31

## 2022-11-07 ENCOUNTER — RX RENEWAL (OUTPATIENT)
Age: 62
End: 2022-11-07

## 2022-11-28 ENCOUNTER — MED ADMIN CHARGE (OUTPATIENT)
Age: 62
End: 2022-11-28

## 2022-11-28 ENCOUNTER — APPOINTMENT (OUTPATIENT)
Dept: INTERNAL MEDICINE | Facility: CLINIC | Age: 62
End: 2022-11-28

## 2022-11-28 VITALS
BODY MASS INDEX: 38.8 KG/M2 | DIASTOLIC BLOOD PRESSURE: 70 MMHG | OXYGEN SATURATION: 96 % | SYSTOLIC BLOOD PRESSURE: 122 MMHG | TEMPERATURE: 98 F | RESPIRATION RATE: 17 BRPM | HEIGHT: 63 IN | WEIGHT: 219 LBS | HEART RATE: 70 BPM

## 2022-11-28 DIAGNOSIS — F32.A DEPRESSION, UNSPECIFIED: ICD-10-CM

## 2022-11-28 PROCEDURE — 90686 IIV4 VACC NO PRSV 0.5 ML IM: CPT

## 2022-11-28 PROCEDURE — 36415 COLL VENOUS BLD VENIPUNCTURE: CPT

## 2022-11-28 PROCEDURE — 99214 OFFICE O/P EST MOD 30 MIN: CPT | Mod: 25

## 2022-11-28 PROCEDURE — 81003 URINALYSIS AUTO W/O SCOPE: CPT | Mod: QW

## 2022-11-28 PROCEDURE — G0008: CPT

## 2022-11-28 NOTE — PLAN
[FreeTextEntry1] : Cardio   -Hyperlipidemia, Obesity -  Patient was advised the importance of participating in aerobic exercise programs improve their stamina.  GILBERT was encourage to start an exercise program.     \par Continue with weight loss. Refilled medication-  Check labs FLP/LFT.\par \par Psych - OCTAVIO- Continue with  xanax.  Counseling given  to the pt - reviewed - paroxetine\par  refilled Paroxetine 30 mg daily.\par \par Endo - Hypothyroidism -  Counseling given to the pt.  Continue with current medication -\par discussed. Refilled levothyroxine - Check labs TSH and free t4  \par \par Endocrinology  -  Obesity  BMI 38  Patient was educated about the importance of diet and exercise.   We discussed  a goal of a BMI near 25.   Patient was advised different treatment modalities.\par discussed short term goal for weight loss.  Advised to get a BMI 25 weight should be under 145 \par \par Patient  education  - COVID-19   Counseling and education provided to the patient.  Advised sign and symptoms of the virus.  Advised contact precautions.  Educated patient on proper hand washing and to participate in social distancing. discussed booster shots - Advised booster shot \par Patient is in full awareness of the plan and agrees to it.  All pt question was answered.  \par \par Immunization - Flu vaccination discussed. Education provided -  Fluzone 0.5 mL administered intramuscularly in the deltoid area.  See consent form for lot number and expiration date.  Administration of vaccine was given by Lauren Amin registered nurse. \par \par I spent 30  Minutes with the patient, half of which we discussed finding on physical exam  and coordinated care.  As well as reviewed my plans and follow ups.

## 2022-11-28 NOTE — ASSESSMENT
[FreeTextEntry1] : Mr. LARSEN is a 61  year  male, who present to the office for  fasting lab, renewal of medications  and a blood pressure check  as well as an influenza shot

## 2022-11-28 NOTE — HISTORY OF PRESENT ILLNESS
[FreeTextEntry1] : Medication renewal, fasting blood work and a general checkup. [de-identified] : Mr. LARSEN is a 61 year  male, who present to the office for medication renewal and fasting labs.    Tc states since his last  office visit there has been no change in his medical history or medication regimen.  Patient denies having chest pain, shortness of breath, heart palpitations nausea or vomiting.  Does state he takes his medication on a routine basis without any side effects to them.  Would like to get this years annual flu shot.  Denies having any adverse effects to prior vaccinations.  Denies history of recent infections or recurrent fever.  Denies having egg allergies or GBS.\par

## 2022-11-28 NOTE — REVIEW OF SYSTEMS
[Recent Change In Weight] : ~T recent weight change [Negative] : Heme/Lymph [Fever] : no fever [Vision Problems] : no vision problems [Earache] : no earache [Hearing Loss] : no hearing loss [Nasal Discharge] : no nasal discharge [Sore Throat] : no sore throat [Chest Pain] : no chest pain [Palpitations] : no palpitations [Lower Ext Edema] : no lower extremity edema [Paroxysmal Nocturnal Dyspnea] : no paroxysmal nocturnal dyspnea [Shortness Of Breath] : no shortness of breath [Wheezing] : no wheezing [Cough] : no cough [Dyspnea on Exertion] : not dyspnea on exertion [Abdominal Pain] : no abdominal pain [Vomiting] : no vomiting [Melena] : no melena [Dysuria] : no dysuria [Joint Pain] : no joint pain [Muscle Weakness] : no muscle weakness [Back Pain] : no back pain [Itching] : no itching [Hair Changes] : no hair changes [Skin Rash] : no skin rash [Headache] : no headache [Fainting] : no fainting [Memory Loss] : no memory loss [Swollen Glands] : no swollen glands [FreeTextEntry2] : some weight loss planed

## 2022-11-28 NOTE — PHYSICAL EXAM
[No Acute Distress] : no acute distress [Well Nourished] : well nourished [Well Developed] : well developed [Well-Appearing] : well-appearing [Normal Sclera/Conjunctiva] : normal sclera/conjunctiva [PERRL] : pupils equal round and reactive to light [EOMI] : extraocular movements intact [Normal Outer Ear/Nose] : the outer ears and nose were normal in appearance [Normal Oropharynx] : the oropharynx was normal [Normal TMs] : both tympanic membranes were normal [Normal Nasal Mucosa] : the nasal mucosa was normal [No JVD] : no jugular venous distention [No Lymphadenopathy] : no lymphadenopathy [Supple] : supple [Thyroid Normal, No Nodules] : the thyroid was normal and there were no nodules present [No Respiratory Distress] : no respiratory distress  [No Accessory Muscle Use] : no accessory muscle use [Clear to Auscultation] : lungs were clear to auscultation bilaterally [Normal Rate] : normal rate  [Regular Rhythm] : with a regular rhythm [Normal S1, S2] : normal S1 and S2 [No Carotid Bruits] : no carotid bruits [No Abdominal Bruit] : a ~M bruit was not heard ~T in the abdomen [Pedal Pulses Present] : the pedal pulses are present [No Edema] : there was no peripheral edema [No Palpable Aorta] : no palpable aorta [No Extremity Clubbing/Cyanosis] : no extremity clubbing/cyanosis [Soft] : abdomen soft [Non Tender] : non-tender [Non-distended] : non-distended [No Masses] : no abdominal mass palpated [No HSM] : no HSM [Normal Bowel Sounds] : normal bowel sounds [Obese] : obese [Normal Sphincter Tone] : normal sphincter tone [Normal Posterior Cervical Nodes] : no posterior cervical lymphadenopathy [Normal Anterior Cervical Nodes] : no anterior cervical lymphadenopathy [No CVA Tenderness] : no CVA  tenderness [No Spinal Tenderness] : no spinal tenderness [No Joint Swelling] : no joint swelling [Grossly Normal Strength/Tone] : grossly normal strength/tone [No Rash] : no rash [Coordination Grossly Intact] : coordination grossly intact [No Focal Deficits] : no focal deficits [Normal Gait] : normal gait [Deep Tendon Reflexes (DTR)] : deep tendon reflexes were 2+ and symmetric [Speech Grossly Normal] : speech grossly normal [Normal Affect] : the affect was normal [Alert and Oriented x3] : oriented to person, place, and time [Normal Mood] : the mood was normal [Normal Insight/Judgement] : insight and judgment were intact [de-identified] : small abrasion left scalp area

## 2022-11-28 NOTE — HEALTH RISK ASSESSMENT
[Former] : Former [Yes] : Yes [2 - 3 times a week (3 pts)] : 2 - 3  times a week (3 points) [1 or 2 (0 pts)] : 1 or 2 (0 points) [Never (0 pts)] : Never (0 points) [No] : In the past 12 months have you used drugs other than those required for medical reasons? 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

## 2022-11-28 NOTE — DATA REVIEWED
[FreeTextEntry1] :   Reviewed [id number] did not request a renewal for Xanax at this office visit.\par   Reviewed prior blood  work done.  PSA was noted.\par reviewed the last prior hemoglobin A1c which resulted normal.\par   Urinalysis today was normal

## 2022-11-28 NOTE — COUNSELING
[Behavioral health counseling provided] : Behavioral health counseling provided [Benefits of weight loss discussed] : Benefits of weight loss discussed [Encouraged to maintain food diary] : Encouraged to maintain food diary [Encouraged to increase physical activity] : Encouraged to increase physical activity [Decrease Portions] : decrease portions [Yes] : Risk of tobacco use and health benefits of smoking cessation discussed: Yes [FreeTextEntry2] : 1800 anay diet  Dash Diet [de-identified] : [facility] counseling

## 2022-12-01 ENCOUNTER — NON-APPOINTMENT (OUTPATIENT)
Age: 62
End: 2022-12-01

## 2022-12-01 LAB
ALBUMIN SERPL ELPH-MCNC: 4.5 G/DL
ALP BLD-CCNC: 68 U/L
ALT SERPL-CCNC: 14 U/L
ANION GAP SERPL CALC-SCNC: 9 MMOL/L
AST SERPL-CCNC: 18 U/L
BASOPHILS # BLD AUTO: 0.04 K/UL
BASOPHILS NFR BLD AUTO: 0.7 %
BILIRUB SERPL-MCNC: 0.5 MG/DL
BUN SERPL-MCNC: 16 MG/DL
CALCIUM SERPL-MCNC: 9.6 MG/DL
CHLORIDE SERPL-SCNC: 102 MMOL/L
CHOLEST SERPL-MCNC: 148 MG/DL
CO2 SERPL-SCNC: 30 MMOL/L
CREAT SERPL-MCNC: 0.94 MG/DL
EGFR: 92 ML/MIN/1.73M2
EOSINOPHIL # BLD AUTO: 0.16 K/UL
EOSINOPHIL NFR BLD AUTO: 2.8 %
GLUCOSE SERPL-MCNC: 105 MG/DL
HCT VFR BLD CALC: 44.7 %
HDLC SERPL-MCNC: 64 MG/DL
HGB BLD-MCNC: 14.7 G/DL
IMM GRANULOCYTES NFR BLD AUTO: 0.2 %
LDLC SERPL CALC-MCNC: 73 MG/DL
LYMPHOCYTES # BLD AUTO: 1.72 K/UL
LYMPHOCYTES NFR BLD AUTO: 30.1 %
MAN DIFF?: NORMAL
MCHC RBC-ENTMCNC: 30.6 PG
MCHC RBC-ENTMCNC: 32.9 GM/DL
MCV RBC AUTO: 92.9 FL
MONOCYTES # BLD AUTO: 0.56 K/UL
MONOCYTES NFR BLD AUTO: 9.8 %
NEUTROPHILS # BLD AUTO: 3.22 K/UL
NEUTROPHILS NFR BLD AUTO: 56.4 %
NONHDLC SERPL-MCNC: 84 MG/DL
PLATELET # BLD AUTO: 274 K/UL
POTASSIUM SERPL-SCNC: 4.1 MMOL/L
PROT SERPL-MCNC: 7.3 G/DL
PSA SERPL-MCNC: 0.67 NG/ML
RBC # BLD: 4.81 M/UL
RBC # FLD: 13.4 %
SODIUM SERPL-SCNC: 141 MMOL/L
T4 FREE SERPL-MCNC: 1.3 NG/DL
TRIGL SERPL-MCNC: 55 MG/DL
TSH SERPL-ACNC: 0.94 UIU/ML
WBC # FLD AUTO: 5.71 K/UL

## 2022-12-07 ENCOUNTER — RX RENEWAL (OUTPATIENT)
Age: 62
End: 2022-12-07

## 2023-02-13 NOTE — REVIEW OF SYSTEMS
Yes [Negative] : Heme/Lymph [Fever] : no fever [Recent Change In Weight] : ~T no recent weight change [Vision Problems] : no vision problems [Earache] : no earache [Hearing Loss] : no hearing loss [Nasal Discharge] : no nasal discharge [Sore Throat] : no sore throat [Chest Pain] : no chest pain [Palpitations] : no palpitations [Lower Ext Edema] : no lower extremity edema [Paroxysmal Nocturnal Dyspnea] : no paroxysmal nocturnal dyspnea [Shortness Of Breath] : no shortness of breath [Wheezing] : no wheezing [Cough] : no cough [Dyspnea on Exertion] : not dyspnea on exertion [Abdominal Pain] : no abdominal pain [Vomiting] : no vomiting [Melena] : no melena [Dysuria] : no dysuria [Joint Pain] : no joint pain [Muscle Weakness] : no muscle weakness [Back Pain] : no back pain [Itching] : no itching [Hair Changes] : no hair changes [Skin Rash] : no skin rash [Headache] : no headache [Fainting] : no fainting [Memory Loss] : no memory loss [Swollen Glands] : no swollen glands

## 2023-02-28 ENCOUNTER — APPOINTMENT (OUTPATIENT)
Dept: INTERNAL MEDICINE | Facility: CLINIC | Age: 63
End: 2023-02-28
Payer: COMMERCIAL

## 2023-02-28 VITALS
TEMPERATURE: 96.6 F | SYSTOLIC BLOOD PRESSURE: 122 MMHG | OXYGEN SATURATION: 97 % | BODY MASS INDEX: 39.2 KG/M2 | HEIGHT: 63 IN | RESPIRATION RATE: 16 BRPM | DIASTOLIC BLOOD PRESSURE: 70 MMHG | WEIGHT: 221.25 LBS | HEART RATE: 70 BPM

## 2023-02-28 PROCEDURE — 36415 COLL VENOUS BLD VENIPUNCTURE: CPT

## 2023-02-28 PROCEDURE — 99214 OFFICE O/P EST MOD 30 MIN: CPT | Mod: 25

## 2023-02-28 NOTE — HISTORY OF PRESENT ILLNESS
[FreeTextEntry1] : Medication renewal, fasting blood work and a general checkup.\par  [de-identified] : Mr. LARSEN is a 62 year  male, who present to the office for medication renewal and fasting labs.    Tc states since his last  office visit there has been no change in his medical history or medication regimen.  Patient denies having chest pain, shortness of breath, heart palpitations nausea or vomiting.  Does state he takes his medication on a routine basis without any side effects to them.\par

## 2023-02-28 NOTE — HEALTH RISK ASSESSMENT
[Yes] : Yes [2 - 3 times a week (3 pts)] : 2 - 3  times a week (3 points) [1 or 2 (0 pts)] : 1 or 2 (0 points) [Never (0 pts)] : Never (0 points) [No] : In the past 12 months have you used drugs other than those required for medical reasons? 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

## 2023-02-28 NOTE — COUNSELING
[Behavioral health counseling provided] : Behavioral health counseling provided [Benefits of weight loss discussed] : Benefits of weight loss discussed [Encouraged to maintain food diary] : Encouraged to maintain food diary [Encouraged to increase physical activity] : Encouraged to increase physical activity [Decrease Portions] : decrease portions [FreeTextEntry2] : 1800 anay diet  Dash Diet [de-identified] : [facility] counseling

## 2023-02-28 NOTE — PHYSICAL EXAM
[No Acute Distress] : no acute distress [Well Nourished] : well nourished [Well Developed] : well developed [Well-Appearing] : well-appearing [Normal Sclera/Conjunctiva] : normal sclera/conjunctiva [PERRL] : pupils equal round and reactive to light [EOMI] : extraocular movements intact [Normal Outer Ear/Nose] : the outer ears and nose were normal in appearance [Normal Oropharynx] : the oropharynx was normal [Normal TMs] : both tympanic membranes were normal [Normal Nasal Mucosa] : the nasal mucosa was normal [No JVD] : no jugular venous distention [No Lymphadenopathy] : no lymphadenopathy [Supple] : supple [Thyroid Normal, No Nodules] : the thyroid was normal and there were no nodules present [No Respiratory Distress] : no respiratory distress  [No Accessory Muscle Use] : no accessory muscle use [Clear to Auscultation] : lungs were clear to auscultation bilaterally [Normal Rate] : normal rate  [Regular Rhythm] : with a regular rhythm [Normal S1, S2] : normal S1 and S2 [No Carotid Bruits] : no carotid bruits [No Abdominal Bruit] : a ~M bruit was not heard ~T in the abdomen [Pedal Pulses Present] : the pedal pulses are present [No Edema] : there was no peripheral edema [No Palpable Aorta] : no palpable aorta [No Extremity Clubbing/Cyanosis] : no extremity clubbing/cyanosis [Soft] : abdomen soft [Non Tender] : non-tender [Non-distended] : non-distended [No Masses] : no abdominal mass palpated [No HSM] : no HSM [Normal Bowel Sounds] : normal bowel sounds [Obese] : obese [Normal Sphincter Tone] : normal sphincter tone [No CVA Tenderness] : no CVA  tenderness [No Spinal Tenderness] : no spinal tenderness [No Joint Swelling] : no joint swelling [Grossly Normal Strength/Tone] : grossly normal strength/tone [No Rash] : no rash [Coordination Grossly Intact] : coordination grossly intact [No Focal Deficits] : no focal deficits [Normal Gait] : normal gait [Deep Tendon Reflexes (DTR)] : deep tendon reflexes were 2+ and symmetric [Speech Grossly Normal] : speech grossly normal [Normal Affect] : the affect was normal [Alert and Oriented x3] : oriented to person, place, and time [Normal Mood] : the mood was normal [Normal Insight/Judgement] : insight and judgment were intact [de-identified] : small abrasion left scalp area

## 2023-02-28 NOTE — PLAN
[FreeTextEntry1] : Cardio   -Hyperlipidemia, Obesity -  Patient was advised the importance of participating in aerobic exercise programs improve their stamina.  GILBERT was encourage to start an exercise program.     \par Encourage weight loss. Refilled medication-  Check labs FLP/LFT.\par \par Psych - OCTAVIO- Continue with  xanax.  Counseling given  to the pt - reviewed - paroxetine\par refilled Paroxetine 30 mg daily.   reviewed , refilled Xanax.\par \par Endo - Hypothyroidism -  Counseling given to the pt.  Continue with current medication -\par discussed. Refilled levothyroxine - Check labs TSH.\par \par Endocrinology  -  Obesity  BMI 39  Patient was educated about the importance of diet and exercise.   We discussed  a goal of a BMI near 25.   Patient was advised different treatment modalities.\par discussed short term goal for weight loss.  Advised to get a BMI 25 weight should be under 145 \par \par Patient  education  - COVID-19   Counseling and education provided to the patient.  Advised sign and symptoms of the virus.  Advised contact precautions.  Educated patient on proper hand washing and to participate in social distancing. discussed booster shots - Advised booster shot \par Patient is in full awareness of the plan and agrees to it.  All pt question was answered.  \par \par I spent 30  Minutes with the patient, half of which we discussed finding on physical exam  and coordinated care.  As well as reviewed my plans and follow ups.

## 2023-02-28 NOTE — REVIEW OF SYSTEMS
[Negative] : Constitutional [Fever] : no fever [Recent Change In Weight] : ~T no recent weight change [Discharge] : no discharge [Vision Problems] : no vision problems [Earache] : no earache [Hearing Loss] : no hearing loss [Nasal Discharge] : no nasal discharge [Sore Throat] : no sore throat [Chest Pain] : no chest pain [Palpitations] : no palpitations [Lower Ext Edema] : no lower extremity edema [Paroxysmal Nocturnal Dyspnea] : no paroxysmal nocturnal dyspnea [Shortness Of Breath] : no shortness of breath [Wheezing] : no wheezing [Cough] : no cough [Dyspnea on Exertion] : not dyspnea on exertion [Abdominal Pain] : no abdominal pain [Vomiting] : no vomiting [Melena] : no melena [Dysuria] : no dysuria [Joint Pain] : no joint pain [Muscle Weakness] : no muscle weakness [Back Pain] : no back pain [Itching] : no itching [Hair Changes] : no hair changes [Skin Rash] : no skin rash [Headache] : no headache [Fainting] : no fainting [Memory Loss] : no memory loss [Swollen Glands] : no swollen glands

## 2023-03-02 LAB
ALBUMIN SERPL ELPH-MCNC: 4 G/DL
ALP BLD-CCNC: 60 U/L
ALT SERPL-CCNC: 13 U/L
ANION GAP SERPL CALC-SCNC: 9 MMOL/L
AST SERPL-CCNC: 12 U/L
BASOPHILS # BLD AUTO: 0.03 K/UL
BASOPHILS NFR BLD AUTO: 0.6 %
BILIRUB SERPL-MCNC: 0.3 MG/DL
BUN SERPL-MCNC: 17 MG/DL
CALCIUM SERPL-MCNC: 9.5 MG/DL
CHLORIDE SERPL-SCNC: 103 MMOL/L
CHOLEST SERPL-MCNC: 145 MG/DL
CO2 SERPL-SCNC: 29 MMOL/L
CREAT SERPL-MCNC: 0.99 MG/DL
EGFR: 86 ML/MIN/1.73M2
EOSINOPHIL # BLD AUTO: 0.17 K/UL
EOSINOPHIL NFR BLD AUTO: 3.3 %
ESTIMATED AVERAGE GLUCOSE: 108 MG/DL
GLUCOSE SERPL-MCNC: 103 MG/DL
HBA1C MFR BLD HPLC: 5.4 %
HCT VFR BLD CALC: 46.1 %
HDLC SERPL-MCNC: 57 MG/DL
HGB BLD-MCNC: 14.7 G/DL
IMM GRANULOCYTES NFR BLD AUTO: 0.2 %
LDLC SERPL CALC-MCNC: 76 MG/DL
LYMPHOCYTES # BLD AUTO: 1.51 K/UL
LYMPHOCYTES NFR BLD AUTO: 29.7 %
MAN DIFF?: NORMAL
MCHC RBC-ENTMCNC: 30.8 PG
MCHC RBC-ENTMCNC: 31.9 GM/DL
MCV RBC AUTO: 96.6 FL
MONOCYTES # BLD AUTO: 0.51 K/UL
MONOCYTES NFR BLD AUTO: 10 %
NEUTROPHILS # BLD AUTO: 2.85 K/UL
NEUTROPHILS NFR BLD AUTO: 56.2 %
NONHDLC SERPL-MCNC: 88 MG/DL
PLATELET # BLD AUTO: 282 K/UL
POTASSIUM SERPL-SCNC: 4.7 MMOL/L
PROT SERPL-MCNC: 6.6 G/DL
RBC # BLD: 4.77 M/UL
RBC # FLD: 13.9 %
SODIUM SERPL-SCNC: 141 MMOL/L
TRIGL SERPL-MCNC: 60 MG/DL
TSH SERPL-ACNC: 1.2 UIU/ML
WBC # FLD AUTO: 5.08 K/UL

## 2023-04-05 ENCOUNTER — RX RENEWAL (OUTPATIENT)
Age: 63
End: 2023-04-05

## 2023-05-07 ENCOUNTER — RX RENEWAL (OUTPATIENT)
Age: 63
End: 2023-05-07

## 2023-05-30 ENCOUNTER — APPOINTMENT (OUTPATIENT)
Dept: INTERNAL MEDICINE | Facility: CLINIC | Age: 63
End: 2023-05-30
Payer: COMMERCIAL

## 2023-05-30 VITALS
RESPIRATION RATE: 16 BRPM | HEART RATE: 71 BPM | BODY MASS INDEX: 38.62 KG/M2 | SYSTOLIC BLOOD PRESSURE: 120 MMHG | WEIGHT: 218 LBS | HEIGHT: 63 IN | TEMPERATURE: 95.7 F | DIASTOLIC BLOOD PRESSURE: 76 MMHG | OXYGEN SATURATION: 97 %

## 2023-05-30 DIAGNOSIS — F41.9 ANXIETY DISORDER, UNSPECIFIED: ICD-10-CM

## 2023-05-30 DIAGNOSIS — Z79.899 OTHER LONG TERM (CURRENT) DRUG THERAPY: ICD-10-CM

## 2023-05-30 PROCEDURE — 36415 COLL VENOUS BLD VENIPUNCTURE: CPT

## 2023-05-30 PROCEDURE — 99214 OFFICE O/P EST MOD 30 MIN: CPT | Mod: 25

## 2023-05-30 RX ORDER — CLOBETASOL PROPIONATE 0.5 MG/G
0.05 OINTMENT TOPICAL
Qty: 30 | Refills: 0 | Status: ACTIVE | COMMUNITY
Start: 2023-05-09

## 2023-05-30 NOTE — PLAN
[FreeTextEntry1] : Cardiology\par hypercholesterolemia - continue Rosuvastatin Calcium 5mg p.o.q.d. as directed, Rx filled - continue low cholesterol/low fat diet - check FLP and LFTs\par Endocrinology\par hypothyroidism - continue Levothyroxine Sodium 75mcg p.o.q.d. as directed, Rx filled - check TSH\par hyperglycemia - continue low carbohydrate diet; previously recommended increasing CV exercise - check hemoglobin A1C\par history of obesity - continue low carbohydrate diet; previously recommended increasing CV exercise\par Recommended taking Vitamin D-3 2000 IU daily with a meal - check Vitamin D \par Dermatology\par tinea cruris - stable\par Psychiatry\par chronic depression/anxiety - continue Paroxetine HCl 30mg p.o.q.d. as directed, Rx filled; continue Alprazolam 0.25mg p.o. p.r.n. as directed for breakthrough anxiety\par Urology\par ED - continue Vardenafil HCl 10mg p.o. p.r.n. as directed\par \par check CBC, CMP, FLP, hemoglobin A1C, TSH, and Vitamin D

## 2023-05-30 NOTE — ASSESSMENT
[FreeTextEntry1] : Patient is a 62 year old male with a past medical history as above who presents for fasting blood work, Rx refill, and general follow-up.

## 2023-05-30 NOTE — HEALTH RISK ASSESSMENT
[Yes] : Yes [2 - 3 times a week (3 pts)] : 2 - 3  times a week (3 points) [1 or 2 (0 pts)] : 1 or 2 (0 points) [Never (0 pts)] : Never (0 points) [No] : In the past 12 months have you used drugs other than those required for medical reasons? 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 [de-identified] : [employer]. [[id number]] : 0

## 2023-05-30 NOTE — PHYSICAL EXAM
[No Acute Distress] : no acute distress [Well Nourished] : well nourished [Well Developed] : well developed [Well-Appearing] : well-appearing [Normal Voice/Communication] : normal voice/communication [Normal Sclera/Conjunctiva] : normal sclera/conjunctiva [PERRL] : pupils equal round and reactive to light [EOMI] : extraocular movements intact [Normal Outer Ear/Nose] : the outer ears and nose were normal in appearance [Normal Oropharynx] : the oropharynx was normal [Normal TMs] : both tympanic membranes were normal [Normal Nasal Mucosa] : the nasal mucosa was normal [No JVD] : no jugular venous distention [No Lymphadenopathy] : no lymphadenopathy [Supple] : supple [No Respiratory Distress] : no respiratory distress  [No Accessory Muscle Use] : no accessory muscle use [Clear to Auscultation] : lungs were clear to auscultation bilaterally [Normal Rate] : normal rate  [Regular Rhythm] : with a regular rhythm [Normal S1, S2] : normal S1 and S2 [No Murmur] : no murmur heard [No Carotid Bruits] : no carotid bruits [No Abdominal Bruit] : a ~M bruit was not heard ~T in the abdomen [No Varicosities] : no varicosities [No Edema] : there was no peripheral edema [No Palpable Aorta] : no palpable aorta [Soft] : abdomen soft [Non Tender] : non-tender [Non-distended] : non-distended [No Masses] : no abdominal mass palpated [No HSM] : no HSM [Normal Bowel Sounds] : normal bowel sounds [No CVA Tenderness] : no CVA  tenderness [No Spinal Tenderness] : no spinal tenderness [No Joint Swelling] : no joint swelling [Grossly Normal Strength/Tone] : grossly normal strength/tone [No Rash] : no rash [Coordination Grossly Intact] : coordination grossly intact [No Focal Deficits] : no focal deficits [Normal Gait] : normal gait [Speech Grossly Normal] : speech grossly normal [Memory Grossly Normal] : memory grossly normal [Normal Affect] : the affect was normal [Alert and Oriented x3] : oriented to person, place, and time [Normal Mood] : the mood was normal [Normal Insight/Judgement] : insight and judgment were intact [Normal Supraclavicular Nodes] : no supraclavicular lymphadenopathy [Normal Posterior Cervical Nodes] : no posterior cervical lymphadenopathy [Normal Anterior Cervical Nodes] : no anterior cervical lymphadenopathy [Kyphosis] : no kyphosis [Scoliosis] : no scoliosis [Acne] : no acne [de-identified] : obese

## 2023-05-30 NOTE — REVIEW OF SYSTEMS
[Recent Change In Weight] : ~T recent weight change [Negative] : Integumentary [Night Sweats] : no night sweats [Itching] : no itching [FreeTextEntry2] : -3 lbs

## 2023-05-30 NOTE — DATA REVIEWED
[No studies available for review at this time.] : No studies available for review at this time. [FreeTextEntry1] : blood work 2/28/23-normal

## 2023-05-30 NOTE — HISTORY OF PRESENT ILLNESS
[FreeTextEntry1] : fasting blood work, Rx refill, and general follow-up [de-identified] : Patient is a 62 year old male with a past medical history as below who presents for fasting blood work, Rx refill, and general follow-up. Patient states he is taking all medications as prescribed and denies any adverse reactions or side effects. He denies any new arthralgias or myalgias on Rosuvastatin Calcium which is taken for hyperlipidemia. He denies heat or cold intolerance on Levothyroxine Sodium which is taken for hypothyroidism. Anxiety is stable on the current regimen of paroxetine and alprazolam prn. Patient has lost 3 pounds since his last visit in February of 2022. He states that he is not exercising to the extent that he should. Patient requests Rx refill for Rosuvastatin Calcium, Levothyroxine Sodium, and Paroxetine.

## 2023-06-01 LAB
ALBUMIN SERPL ELPH-MCNC: 4.6 G/DL
ALP BLD-CCNC: 73 U/L
ALT SERPL-CCNC: 16 U/L
ANION GAP SERPL CALC-SCNC: 13 MMOL/L
AST SERPL-CCNC: 19 U/L
BILIRUB SERPL-MCNC: 0.5 MG/DL
BUN SERPL-MCNC: 15 MG/DL
CALCIUM SERPL-MCNC: 9.6 MG/DL
CHLORIDE SERPL-SCNC: 102 MMOL/L
CHOLEST SERPL-MCNC: 155 MG/DL
CO2 SERPL-SCNC: 28 MMOL/L
CREAT SERPL-MCNC: 0.96 MG/DL
EGFR: 89 ML/MIN/1.73M2
ESTIMATED AVERAGE GLUCOSE: 114 MG/DL
GLUCOSE SERPL-MCNC: 90 MG/DL
HBA1C MFR BLD HPLC: 5.6 %
HDLC SERPL-MCNC: 54 MG/DL
LDLC SERPL CALC-MCNC: 85 MG/DL
NONHDLC SERPL-MCNC: 100 MG/DL
POTASSIUM SERPL-SCNC: 4.4 MMOL/L
PROT SERPL-MCNC: 7.1 G/DL
SODIUM SERPL-SCNC: 143 MMOL/L
TRIGL SERPL-MCNC: 76 MG/DL
TSH SERPL-ACNC: 1.22 UIU/ML

## 2023-06-02 ENCOUNTER — APPOINTMENT (OUTPATIENT)
Dept: DERMATOLOGY | Facility: CLINIC | Age: 63
End: 2023-06-02

## 2023-06-02 ENCOUNTER — NON-APPOINTMENT (OUTPATIENT)
Age: 63
End: 2023-06-02

## 2023-09-11 ENCOUNTER — RESULT CHARGE (OUTPATIENT)
Age: 63
End: 2023-09-11

## 2023-09-12 ENCOUNTER — APPOINTMENT (OUTPATIENT)
Dept: INTERNAL MEDICINE | Facility: CLINIC | Age: 63
End: 2023-09-12
Payer: COMMERCIAL

## 2023-09-12 ENCOUNTER — MED ADMIN CHARGE (OUTPATIENT)
Age: 63
End: 2023-09-12

## 2023-09-12 VITALS
TEMPERATURE: 96.4 F | RESPIRATION RATE: 16 BRPM | SYSTOLIC BLOOD PRESSURE: 110 MMHG | HEART RATE: 83 BPM | HEIGHT: 63 IN | BODY MASS INDEX: 39.69 KG/M2 | OXYGEN SATURATION: 98 % | WEIGHT: 224 LBS | DIASTOLIC BLOOD PRESSURE: 70 MMHG

## 2023-09-12 DIAGNOSIS — Z00.00 ENCOUNTER FOR GENERAL ADULT MEDICAL EXAMINATION W/OUT ABNORMAL FINDINGS: ICD-10-CM

## 2023-09-12 DIAGNOSIS — Z23 ENCOUNTER FOR IMMUNIZATION: ICD-10-CM

## 2023-09-12 DIAGNOSIS — E78.9 DISORDER OF LIPOPROTEIN METABOLISM, UNSPECIFIED: ICD-10-CM

## 2023-09-12 DIAGNOSIS — R79.89 OTHER SPECIFIED ABNORMAL FINDINGS OF BLOOD CHEMISTRY: ICD-10-CM

## 2023-09-12 DIAGNOSIS — R73.9 HYPERGLYCEMIA, UNSPECIFIED: ICD-10-CM

## 2023-09-12 PROCEDURE — 90686 IIV4 VACC NO PRSV 0.5 ML IM: CPT

## 2023-09-12 PROCEDURE — G0008: CPT

## 2023-09-12 PROCEDURE — 99214 OFFICE O/P EST MOD 30 MIN: CPT | Mod: 25

## 2023-09-12 RX ORDER — CLOTRIMAZOLE AND BETAMETHASONE DIPROPIONATE 10; .5 MG/G; MG/G
1-0.05 CREAM TOPICAL TWICE DAILY
Qty: 1 | Refills: 1 | Status: DISCONTINUED | COMMUNITY
Start: 2022-08-29 | End: 2023-09-12

## 2023-09-12 RX ORDER — VARDENAFIL 10 MG/1
10 TABLET, FILM COATED ORAL
Qty: 10 | Refills: 4 | Status: ACTIVE | COMMUNITY
Start: 2021-10-01 | End: 1900-01-01

## 2023-09-13 LAB
ALBUMIN SERPL ELPH-MCNC: 4.6 G/DL
ALP BLD-CCNC: 75 U/L
ALT SERPL-CCNC: 16 U/L
ANION GAP SERPL CALC-SCNC: 12 MMOL/L
AST SERPL-CCNC: 15 U/L
BASOPHILS # BLD AUTO: 0.03 K/UL
BASOPHILS NFR BLD AUTO: 0.5 %
BILIRUB SERPL-MCNC: 0.6 MG/DL
BILIRUB UR QL STRIP: NEGATIVE
BUN SERPL-MCNC: 14 MG/DL
CALCIUM SERPL-MCNC: 9.7 MG/DL
CHLORIDE SERPL-SCNC: 102 MMOL/L
CHOLEST SERPL-MCNC: 158 MG/DL
CLARITY UR: CLEAR
CO2 SERPL-SCNC: 27 MMOL/L
COLLECTION METHOD: NORMAL
CREAT SERPL-MCNC: 0.89 MG/DL
EGFR: 96 ML/MIN/1.73M2
EOSINOPHIL # BLD AUTO: 0.1 K/UL
EOSINOPHIL NFR BLD AUTO: 1.6 %
ESTIMATED AVERAGE GLUCOSE: 111 MG/DL
GLUCOSE SERPL-MCNC: 99 MG/DL
GLUCOSE UR-MCNC: NEGATIVE
HBA1C MFR BLD HPLC: 5.5 %
HCG UR QL: 0.2 EU/DL
HCT VFR BLD CALC: 46.5 %
HDLC SERPL-MCNC: 60 MG/DL
HGB BLD-MCNC: 15 G/DL
HGB UR QL STRIP.AUTO: NEGATIVE
IMM GRANULOCYTES NFR BLD AUTO: 0.3 %
KETONES UR-MCNC: NEGATIVE
LDLC SERPL CALC-MCNC: 86 MG/DL
LEUKOCYTE ESTERASE UR QL STRIP: NEGATIVE
LYMPHOCYTES # BLD AUTO: 1.46 K/UL
LYMPHOCYTES NFR BLD AUTO: 22.9 %
MAN DIFF?: NORMAL
MCHC RBC-ENTMCNC: 30.7 PG
MCHC RBC-ENTMCNC: 32.3 GM/DL
MCV RBC AUTO: 95.1 FL
MONOCYTES # BLD AUTO: 0.58 K/UL
MONOCYTES NFR BLD AUTO: 9.1 %
NEUTROPHILS # BLD AUTO: 4.18 K/UL
NEUTROPHILS NFR BLD AUTO: 65.6 %
NITRITE UR QL STRIP: NEGATIVE
NONHDLC SERPL-MCNC: 98 MG/DL
PH UR STRIP: 6.5
PLATELET # BLD AUTO: 269 K/UL
POTASSIUM SERPL-SCNC: 4.4 MMOL/L
PROT SERPL-MCNC: 7 G/DL
PROT UR STRIP-MCNC: NEGATIVE
PSA SERPL-MCNC: 0.49 NG/ML
RBC # BLD: 4.89 M/UL
RBC # FLD: 13.8 %
SODIUM SERPL-SCNC: 141 MMOL/L
SP GR UR STRIP: 1.02
TRIGL SERPL-MCNC: 57 MG/DL
TSH SERPL-ACNC: 0.64 UIU/ML
WBC # FLD AUTO: 6.37 K/UL

## 2023-10-20 ENCOUNTER — APPOINTMENT (OUTPATIENT)
Dept: INTERNAL MEDICINE | Facility: CLINIC | Age: 63
End: 2023-10-20

## 2024-01-17 ENCOUNTER — APPOINTMENT (OUTPATIENT)
Dept: INTERNAL MEDICINE | Facility: CLINIC | Age: 64
End: 2024-01-17
Payer: COMMERCIAL

## 2024-01-17 VITALS
RESPIRATION RATE: 16 BRPM | OXYGEN SATURATION: 98 % | HEART RATE: 52 BPM | BODY MASS INDEX: 38.45 KG/M2 | WEIGHT: 217 LBS | DIASTOLIC BLOOD PRESSURE: 71 MMHG | SYSTOLIC BLOOD PRESSURE: 108 MMHG | HEIGHT: 63 IN | TEMPERATURE: 97.2 F

## 2024-01-17 DIAGNOSIS — E78.00 PURE HYPERCHOLESTEROLEMIA, UNSPECIFIED: ICD-10-CM

## 2024-01-17 DIAGNOSIS — E03.9 HYPOTHYROIDISM, UNSPECIFIED: ICD-10-CM

## 2024-01-17 PROCEDURE — 99214 OFFICE O/P EST MOD 30 MIN: CPT

## 2024-01-17 PROCEDURE — 36415 COLL VENOUS BLD VENIPUNCTURE: CPT

## 2024-01-17 PROCEDURE — G2211 COMPLEX E/M VISIT ADD ON: CPT

## 2024-01-17 RX ORDER — PAROXETINE HYDROCHLORIDE 30 MG/1
30 TABLET, FILM COATED ORAL
Qty: 30 | Refills: 5 | Status: ACTIVE | COMMUNITY
Start: 2021-03-05 | End: 1900-01-01

## 2024-01-17 RX ORDER — ROSUVASTATIN CALCIUM 5 MG/1
5 TABLET, FILM COATED ORAL
Qty: 30 | Refills: 5 | Status: ACTIVE | COMMUNITY
Start: 2021-03-26 | End: 1900-01-01

## 2024-01-17 RX ORDER — LEVOTHYROXINE SODIUM 0.07 MG/1
75 TABLET ORAL
Qty: 30 | Refills: 5 | Status: ACTIVE | COMMUNITY
Start: 2019-06-17 | End: 1900-01-01

## 2024-01-17 NOTE — PHYSICAL EXAM
[No Acute Distress] : no acute distress [Well Nourished] : well nourished [Well Developed] : well developed [Well-Appearing] : well-appearing [Normal Sclera/Conjunctiva] : normal sclera/conjunctiva [PERRL] : pupils equal round and reactive to light [EOMI] : extraocular movements intact [Normal Outer Ear/Nose] : the outer ears and nose were normal in appearance [Normal Oropharynx] : the oropharynx was normal [Normal TMs] : both tympanic membranes were normal [Normal Nasal Mucosa] : the nasal mucosa was normal [No JVD] : no jugular venous distention [No Lymphadenopathy] : no lymphadenopathy [Supple] : supple [Thyroid Normal, No Nodules] : the thyroid was normal and there were no nodules present [No Respiratory Distress] : no respiratory distress  [No Accessory Muscle Use] : no accessory muscle use [Clear to Auscultation] : lungs were clear to auscultation bilaterally [Normal Rate] : normal rate  [Regular Rhythm] : with a regular rhythm [Normal S1, S2] : normal S1 and S2 [No Carotid Bruits] : no carotid bruits [No Abdominal Bruit] : a ~M bruit was not heard ~T in the abdomen [Pedal Pulses Present] : the pedal pulses are present [No Edema] : there was no peripheral edema [No Palpable Aorta] : no palpable aorta [No Extremity Clubbing/Cyanosis] : no extremity clubbing/cyanosis [Soft] : abdomen soft [Non Tender] : non-tender [Non-distended] : non-distended [No Masses] : no abdominal mass palpated [No HSM] : no HSM [Normal Bowel Sounds] : normal bowel sounds [Obese] : obese [Normal Sphincter Tone] : normal sphincter tone [Normal Posterior Cervical Nodes] : no posterior cervical lymphadenopathy [Normal Anterior Cervical Nodes] : no anterior cervical lymphadenopathy [No CVA Tenderness] : no CVA  tenderness [No Spinal Tenderness] : no spinal tenderness [No Joint Swelling] : no joint swelling [Grossly Normal Strength/Tone] : grossly normal strength/tone [No Rash] : no rash [Coordination Grossly Intact] : coordination grossly intact [No Focal Deficits] : no focal deficits [Normal Gait] : normal gait [Deep Tendon Reflexes (DTR)] : deep tendon reflexes were 2+ and symmetric [Speech Grossly Normal] : speech grossly normal [Normal Affect] : the affect was normal [Alert and Oriented x3] : oriented to person, place, and time [Normal Mood] : the mood was normal [Normal Insight/Judgement] : insight and judgment were intact [de-identified] : small abrasion left scalp area

## 2024-01-17 NOTE — HEALTH RISK ASSESSMENT
[Yes] : Yes [2 - 3 times a week (3 pts)] : 2 - 3  times a week (3 points) [1 or 2 (0 pts)] : 1 or 2 (0 points) [Never (0 pts)] : Never (0 points) [No] : In the past 12 months have you used drugs other than those required for medical reasons? 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

## 2024-01-17 NOTE — COUNSELING
[Behavioral health counseling provided] : Behavioral health counseling provided [Benefits of weight loss discussed] : Benefits of weight loss discussed [Encouraged to maintain food diary] : Encouraged to maintain food diary [Encouraged to increase physical activity] : Encouraged to increase physical activity [Decrease Portions] : decrease portions [Good understanding] : Patient has a good understanding of disease, goals and obesity follow-up plan [FreeTextEntry2] : 1800 anay diet  Dash Diet Discussed increasing activity [de-identified] : STI counseling

## 2024-01-17 NOTE — HEALTH RISK ASSESSMENT
[Yes] : Yes [2 - 3 times a week (3 pts)] : 2 - 3  times a week (3 points) [1 or 2 (0 pts)] : 1 or 2 (0 points) [Never (0 pts)] : Never (0 points) [No] : In the past 12 months have you used drugs other than those required for medical reasons? 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

## 2024-01-17 NOTE — DATA REVIEWED
[FreeTextEntry1] : Reviewed labs from His prior visit with September 2023.  Reviewed colonoscopy.  Advised patient to repeat colonoscopy sometime this year which would be going into his fifth year.

## 2024-01-17 NOTE — PLAN
[FreeTextEntry1] : Cardio   -Hyperlipidemia, Obesity - Patient was advised the importance of participating in aerobic exercise programs improve their stamina.  GILBERT was encouraged to start an exercise program.      Encourage weight loss. Refilled medication- Check labs FLP/LFT.  Psych - OCTAVIO- Counseling given to the pt. refilled Paroxetine 30 mg daily.  Check comprehensive metabolic, TSH.  Endo - Hypothyroidism - Counseling given to the pt.  Continue with current medication - Refilled levothyroxine - Check labs TSH.  Endocrinology - Obesity BMI 38 Patient was educated about the importance of diet and exercise.   We discussed a goal of a BMI near 25.   Patient was advised different treatment modalities. discussed short term goal for weight loss.  Advised to get a BMI 25 weight should be under 145. Encourage patient increase his activity level.  Gastroenterology-reviewed prior colonoscopy report done in 2019 where a small polyp was removed advised patient to repeat colonoscopy this year  Patient education  - COVID-19   Counseling and education provided to the patient.  Advised sign and symptoms of the virus.  Advised contact precautions.  Educated patient on proper hand washing and to participate in social distancing. discussed booster shots - Advised booster shot  Patient is in full awareness of the plan and agrees to it.  All pt question was answered.    I spent 31  Minutes with the patient, half of which we discussed finding on physical exam  and coordinated care.  As well as reviewed my plans and follow ups. Dragon speech recognition software was used to create portions of this document.  An attempt at proofreading has been made to minimize errors please call if any questions arise.

## 2024-01-17 NOTE — COUNSELING
[Behavioral health counseling provided] : Behavioral health counseling provided [Benefits of weight loss discussed] : Benefits of weight loss discussed [Encouraged to maintain food diary] : Encouraged to maintain food diary [Encouraged to increase physical activity] : Encouraged to increase physical activity [Decrease Portions] : decrease portions [Good understanding] : Patient has a good understanding of disease, goals and obesity follow-up plan [FreeTextEntry2] : 1800 anay diet  Dash Diet Discussed increasing activity [de-identified] : STI counseling

## 2024-01-17 NOTE — PHYSICAL EXAM
[No Acute Distress] : no acute distress [Well Nourished] : well nourished [Well Developed] : well developed [Well-Appearing] : well-appearing [Normal Sclera/Conjunctiva] : normal sclera/conjunctiva [PERRL] : pupils equal round and reactive to light [EOMI] : extraocular movements intact [Normal Outer Ear/Nose] : the outer ears and nose were normal in appearance [Normal Oropharynx] : the oropharynx was normal [Normal TMs] : both tympanic membranes were normal [Normal Nasal Mucosa] : the nasal mucosa was normal [No JVD] : no jugular venous distention [No Lymphadenopathy] : no lymphadenopathy [Supple] : supple [Thyroid Normal, No Nodules] : the thyroid was normal and there were no nodules present [No Respiratory Distress] : no respiratory distress  [No Accessory Muscle Use] : no accessory muscle use [Clear to Auscultation] : lungs were clear to auscultation bilaterally [Normal Rate] : normal rate  [Regular Rhythm] : with a regular rhythm [Normal S1, S2] : normal S1 and S2 [No Carotid Bruits] : no carotid bruits [No Abdominal Bruit] : a ~M bruit was not heard ~T in the abdomen [Pedal Pulses Present] : the pedal pulses are present [No Edema] : there was no peripheral edema [No Palpable Aorta] : no palpable aorta [No Extremity Clubbing/Cyanosis] : no extremity clubbing/cyanosis [Soft] : abdomen soft [Non Tender] : non-tender [Non-distended] : non-distended [No Masses] : no abdominal mass palpated [No HSM] : no HSM [Normal Bowel Sounds] : normal bowel sounds [Obese] : obese [Normal Sphincter Tone] : normal sphincter tone [Normal Posterior Cervical Nodes] : no posterior cervical lymphadenopathy [Normal Anterior Cervical Nodes] : no anterior cervical lymphadenopathy [No CVA Tenderness] : no CVA  tenderness [No Spinal Tenderness] : no spinal tenderness [No Joint Swelling] : no joint swelling [Grossly Normal Strength/Tone] : grossly normal strength/tone [No Rash] : no rash [Coordination Grossly Intact] : coordination grossly intact [No Focal Deficits] : no focal deficits [Normal Gait] : normal gait [Deep Tendon Reflexes (DTR)] : deep tendon reflexes were 2+ and symmetric [Speech Grossly Normal] : speech grossly normal [Normal Affect] : the affect was normal [Alert and Oriented x3] : oriented to person, place, and time [Normal Mood] : the mood was normal [Normal Insight/Judgement] : insight and judgment were intact [de-identified] : small abrasion left scalp area

## 2024-01-17 NOTE — REVIEW OF SYSTEMS
[Negative] : Heme/Lymph [Fever] : no fever [Recent Change In Weight] : ~T no recent weight change [Discharge] : no discharge [Vision Problems] : no vision problems [Earache] : no earache [Hearing Loss] : no hearing loss [Nasal Discharge] : no nasal discharge [Sore Throat] : no sore throat [Chest Pain] : no chest pain [Palpitations] : no palpitations [Lower Ext Edema] : no lower extremity edema [Paroxysmal Nocturnal Dyspnea] : no paroxysmal nocturnal dyspnea [Shortness Of Breath] : no shortness of breath [Wheezing] : no wheezing [Cough] : no cough [Dyspnea on Exertion] : not dyspnea on exertion [Abdominal Pain] : no abdominal pain [Vomiting] : no vomiting [Melena] : no melena [Dysuria] : no dysuria [Joint Pain] : no joint pain [Muscle Weakness] : no muscle weakness [Back Pain] : no back pain [Itching] : no itching [Hair Changes] : no hair changes [Skin Rash] : no skin rash [Headache] : no headache [Fainting] : no fainting [Memory Loss] : no memory loss [Swollen Glands] : no swollen glands

## 2024-01-17 NOTE — HISTORY OF PRESENT ILLNESS
[FreeTextEntry1] : Medication renewal, fasting blood work and a general checkup.  [de-identified] : Mr. LARSEN is a 62-year male, who present to the office for medication renewal and fasting labs.    Tc states He feels good.  No new chief complaint or medical concerns.  States he takes his medication on a daily basis without any side effects.  Denies any recent infection.

## 2024-01-17 NOTE — HISTORY OF PRESENT ILLNESS
This office note has been dictated.  Medical assistant history and information reviewed.  Past Medical History:   Diagnosis Date    Actinic keratosis 2010    Chronic airway obstruction, not elsewhere classified 10/97    COPD    Disorder of eye, unspecified     refractive error    Disorder of eye, unspecified     Elevated blood pressure reading without diagnosis of hypertension     FAMILY HX-DIABETES MELLITUS     FAMILY HX-ISCHEM HEART DIS 2002    Other and unspecified hyperlipidemia     Other and unspecified hyperlipidemia 2002    Palpitations 00    Sprain and strain of other specified sites of shoulder and upper arm 2010    Sprain and strain of other specified sites of shoulder and upper arm 2010    Temporomandibular joint disorders, unspecified     Temporomandibular joint disorders, unspecified      Family History   Problem Relation Age of Onset    Cancer, Breast Mother 60    Cancer Mother         breast    Diabetes Mother     Cancer, Prostate Father 65    Cancer Father         prostate    Cancer, Breast Maternal Aunt 60     Family Status   Relation Name Status    Mo  Alive    Fa   at age 73        prostate ca    MAunt  (Not Specified)     Past Surgical History:   Procedure Laterality Date    Colonoscopy diagnostic  8/10/10    Dr. Oconnor, Hemorrhoids, F/U 10 years    Colonoscopy remove lesions by snare  2021    Dr. Oconnor Hyperplastic and Tubular adenoma. F/U 5 yrs    Holter monitor wearable  00    Total abdom hysterectomy  00    CARLOS A with Ovaries Intact/Dr. Keyes     Social History     Socioeconomic History    Marital status: Single     Spouse name: Not on file    Number of children: 0    Years of education: Not on file    Highest education level: Not on file   Occupational History    Occupation: teacher     Employer: [employer]     Comment: retired spring 2019   Tobacco Use    Smoking status: Former     Current packs/day: 0.00     Types: Cigarettes      Start date: 1979     Quit date: 1989     Years since quittin.9    Smokeless tobacco: Never    Tobacco comments:     smoked socially   Substance and Sexual Activity    Alcohol use: Yes     Alcohol/week: 7.0 standard drinks of alcohol     Types: 7 Standard drinks or equivalent per week    Drug use: Never    Sexual activity: Not on file   Other Topics Concern    Not on file   Social History Narrative    Not on file     Social Determinants of Health     Financial Resource Strain: Not on file   Food Insecurity: Not on file   Transportation Needs: Not on file   Physical Activity: Not on file   Stress: Not on file   Social Connections: Not on file   Intimate Partner Violence: Not on file     OB History    Para Term  AB Living   0 0 0 0 0 0   SAB IAB Ectopic Molar Multiple Live Births   0 0 0 0 0 0        [FreeTextEntry1] : Medication renewal, fasting blood work and a general checkup.  [de-identified] : Mr. LARSEN is a 62-year male, who present to the office for medication renewal and fasting labs.    Tc states He feels good.  No new chief complaint or medical concerns.  States he takes his medication on a daily basis without any side effects.  Denies any recent infection.

## 2024-01-17 NOTE — ASSESSMENT
[FreeTextEntry1] : Mr. LARSEN is a 63 year male, with a past medical history as noted above, who present to the office today for All medication renewal, fasting labs and general checkup

## 2024-01-18 LAB
25(OH)D3 SERPL-MCNC: 38.1 NG/ML
ALBUMIN SERPL ELPH-MCNC: 4.4 G/DL
ALP BLD-CCNC: 70 U/L
ALT SERPL-CCNC: 15 U/L
ANION GAP SERPL CALC-SCNC: 10 MMOL/L
AST SERPL-CCNC: 18 U/L
BASOPHILS # BLD AUTO: 0.04 K/UL
BASOPHILS NFR BLD AUTO: 0.7 %
BILIRUB SERPL-MCNC: 0.7 MG/DL
BUN SERPL-MCNC: 14 MG/DL
CALCIUM SERPL-MCNC: 9.4 MG/DL
CHLORIDE SERPL-SCNC: 104 MMOL/L
CHOLEST SERPL-MCNC: 145 MG/DL
CO2 SERPL-SCNC: 29 MMOL/L
CREAT SERPL-MCNC: 0.98 MG/DL
EGFR: 86 ML/MIN/1.73M2
EOSINOPHIL # BLD AUTO: 0.12 K/UL
EOSINOPHIL NFR BLD AUTO: 2.2 %
GLUCOSE SERPL-MCNC: 104 MG/DL
HCT VFR BLD CALC: 45.4 %
HDLC SERPL-MCNC: 62 MG/DL
HGB BLD-MCNC: 14.7 G/DL
IMM GRANULOCYTES NFR BLD AUTO: 0.2 %
LDLC SERPL CALC-MCNC: 70 MG/DL
LYMPHOCYTES # BLD AUTO: 1.43 K/UL
LYMPHOCYTES NFR BLD AUTO: 26.7 %
MAN DIFF?: NORMAL
MCHC RBC-ENTMCNC: 30 PG
MCHC RBC-ENTMCNC: 32.4 GM/DL
MCV RBC AUTO: 92.7 FL
MONOCYTES # BLD AUTO: 0.55 K/UL
MONOCYTES NFR BLD AUTO: 10.3 %
NEUTROPHILS # BLD AUTO: 3.2 K/UL
NEUTROPHILS NFR BLD AUTO: 59.9 %
NONHDLC SERPL-MCNC: 84 MG/DL
PLATELET # BLD AUTO: 285 K/UL
POTASSIUM SERPL-SCNC: 4.6 MMOL/L
PROT SERPL-MCNC: 7 G/DL
RBC # BLD: 4.9 M/UL
RBC # FLD: 14 %
SODIUM SERPL-SCNC: 143 MMOL/L
TRIGL SERPL-MCNC: 65 MG/DL
TSH SERPL-ACNC: 1.33 UIU/ML
WBC # FLD AUTO: 5.35 K/UL

## 2024-03-21 ENCOUNTER — APPOINTMENT (OUTPATIENT)
Dept: INTERNAL MEDICINE | Facility: CLINIC | Age: 64
End: 2024-03-21
Payer: COMMERCIAL

## 2024-03-21 DIAGNOSIS — N52.9 MALE ERECTILE DYSFUNCTION, UNSPECIFIED: ICD-10-CM

## 2024-03-21 PROCEDURE — 99213 OFFICE O/P EST LOW 20 MIN: CPT

## 2024-03-21 PROCEDURE — G2211 COMPLEX E/M VISIT ADD ON: CPT

## 2024-03-21 RX ORDER — TADALAFIL 20 MG/1
20 TABLET ORAL
Qty: 7 | Refills: 5 | Status: ACTIVE | COMMUNITY
Start: 2024-03-21 | End: 1900-01-01

## 2024-03-21 NOTE — HISTORY OF PRESENT ILLNESS
[Other Location: e.g. School (Enter Location, City,State)___] : at [unfilled], at the time of the visit. [Medical Office: ([facility])___] : at the medical office located in  [Verbal consent obtained from patient] : the patient, [unfilled] [de-identified] : Mr. LARSEN is a 63 year- male, with a past medical history as noted below, who present to the office today for medication management. Requesting refill for Cialis  20 mg which he takes for ED.  Sometimes gets a dull headache with the medications.      [FreeTextEntry1] : Medication management

## 2024-03-21 NOTE — REVIEW OF SYSTEMS
[Hesitancy] : no hesitancy [Incontinence] : no incontinence [Nocturia] : no nocturia [Hematuria] : no hematuria [Impotence] : no impotency [Frequency] : no frequency [Poor Libido] : poor libido [Negative] : Heme/Lymph

## 2024-03-21 NOTE — PHYSICAL EXAM
[No Acute Distress] : no acute distress [Well Nourished] : well nourished [Speech Grossly Normal] : speech grossly normal [Well Developed] : well developed [Memory Grossly Normal] : memory grossly normal [Normal Affect] : the affect was normal [Alert and Oriented x3] : oriented to person, place, and time [Normal Insight/Judgement] : insight and judgment were intact

## 2024-03-21 NOTE — ASSESSMENT
[FreeTextEntry1] : Mr. LARSEN is a 63 year male, with a past medical history as noted above, who present to the office today for medication renewal for ED

## 2024-03-21 NOTE — PLAN
[FreeTextEntry1] :  - ED - Counseling given to the pt.  Discussed Cialis- advised to take 1 hour prior to intercourse,   Advised side effects.  Discussed alternative medications  RTO in 05/24 for a follow up follow up with  Medication was called into [facility] pharmacy.   I spent 20 Minutes with the patient, half of which we discussed finding on physical exam and coordinated care.  As well as reviewed my plans and follow ups. Dragon speech recognition software was used to create portions of this document.  An attempt at proofreading has been made to minimize errors please call if any questions arise.

## 2024-05-15 ENCOUNTER — APPOINTMENT (OUTPATIENT)
Dept: INTERNAL MEDICINE | Facility: CLINIC | Age: 64
End: 2024-05-15
